# Patient Record
Sex: MALE | Race: WHITE | Employment: OTHER | URBAN - METROPOLITAN AREA
[De-identification: names, ages, dates, MRNs, and addresses within clinical notes are randomized per-mention and may not be internally consistent; named-entity substitution may affect disease eponyms.]

---

## 2019-08-11 DIAGNOSIS — M25.561 ACUTE PAIN OF RIGHT KNEE: Primary | ICD-10-CM

## 2019-08-12 ENCOUNTER — OFFICE VISIT (OUTPATIENT)
Dept: ORTHOPEDIC SURGERY | Age: 74
End: 2019-08-12
Payer: MEDICARE

## 2019-08-12 VITALS — HEIGHT: 70 IN | WEIGHT: 220 LBS | BODY MASS INDEX: 31.5 KG/M2 | TEMPERATURE: 98 F

## 2019-08-12 DIAGNOSIS — M17.11 PRIMARY OSTEOARTHRITIS OF RIGHT KNEE: Primary | ICD-10-CM

## 2019-08-12 PROCEDURE — G8417 CALC BMI ABV UP PARAM F/U: HCPCS | Performed by: ORTHOPAEDIC SURGERY

## 2019-08-12 PROCEDURE — 4004F PT TOBACCO SCREEN RCVD TLK: CPT | Performed by: ORTHOPAEDIC SURGERY

## 2019-08-12 PROCEDURE — 99203 OFFICE O/P NEW LOW 30 MIN: CPT | Performed by: ORTHOPAEDIC SURGERY

## 2019-08-12 PROCEDURE — G8427 DOCREV CUR MEDS BY ELIG CLIN: HCPCS | Performed by: ORTHOPAEDIC SURGERY

## 2019-08-12 PROCEDURE — 3017F COLORECTAL CA SCREEN DOC REV: CPT | Performed by: ORTHOPAEDIC SURGERY

## 2019-08-12 PROCEDURE — 4040F PNEUMOC VAC/ADMIN/RCVD: CPT | Performed by: ORTHOPAEDIC SURGERY

## 2019-08-12 PROCEDURE — 1123F ACP DISCUSS/DSCN MKR DOCD: CPT | Performed by: ORTHOPAEDIC SURGERY

## 2019-08-12 RX ORDER — PRAVASTATIN SODIUM 20 MG
TABLET ORAL
Refills: 1 | COMMUNITY
Start: 2019-07-11

## 2019-08-12 NOTE — PROGRESS NOTES
Chief Complaint   Patient presents with    Knee Pain     Right Knee, x couple months, states of pain in front of the knee. Subjective:     Patient ID: Filomena Han is a 68 y.o..  male    Knee Pain  Patient complains of right knee pain. This is evaluated as a personal injury. There was not a history of injury. The pain began 3 months ago. The pain is located medial, lateral. He describes  His symptoms as aching. He has not experienced popping, clicking, locking, and giving way in the affected knee. The patient has had pain with kneeling, squating, and climbing stairs. Symptoms improve with rest. The symptoms are worse with activity. The knee has not given out or felt unstable. The patient can bend and straighten the knee fully. The patient is active in none. Treatment to date has been Tylenol, NSAID's, with significant relief. History reviewed. No pertinent past medical history. History reviewed. No pertinent surgical history.     Current Outpatient Medications:     pravastatin (PRAVACHOL) 20 MG tablet, TK 1 T PO QD HS, Disp: , Rfl: 1    aspirin 81 MG tablet, Take 81 mg by mouth daily, Disp: , Rfl:   No Known Allergies  Social History     Socioeconomic History    Marital status: Unknown     Spouse name: Not on file    Number of children: Not on file    Years of education: Not on file    Highest education level: Not on file   Occupational History    Not on file   Social Needs    Financial resource strain: Not on file    Food insecurity:     Worry: Not on file     Inability: Not on file    Transportation needs:     Medical: Not on file     Non-medical: Not on file   Tobacco Use    Smoking status: Not on file   Substance and Sexual Activity    Alcohol use: Not on file    Drug use: Not on file    Sexual activity: Not on file   Lifestyle    Physical activity:     Days per week: Not on file     Minutes per session: Not on file    Stress: Not on file   Relationships    Social fremitus. Skin:  Upon inspection: the skin appears warm, dry and intact. There is  a previous scar over the affected area. There is any cellulitis, lymphedema or cutaneous lesions noted in the lower extremities. Upon palpation there is no induration noted. Neurologic:  Gait: normal;  Motor exam of the lower extremities show ; quadriceps, hamstrings, foot dorsi and plantar flexors intact R.  5/5 and L. 5/5. Deep tendon reflexes are 2/4 at the knees and 2/4 at the ankles with strong extensor hallicus longus motor strength bilaterally. Sensory to both feet is intact to all sensory roots. Cardiovascular: The vascular exam is normal and is well perfused to distal extremities. Distal pulses DP/PT: R. 2+; L. 2+. There is cap refill noted less than two seconds in all digits. There is not edema of the bilateral lower extremities. There is not varicosities noted in the distal extremities. Lymph:  Upon palpation,  there is no lymphadenopathy noted in bilateral lower extremities. Musculoskeletal:  Gait: normal; examination of the nails and digits reveal no cyanosis or clubbing. Lumbar exam:  On visual inspection, there is not deformity of the spine. full range of motion, no tenderness, palpable spasm or pain on motion. Special tests: Straight Leg Raise negative, Skylar test negative. Hip exam:   Upon inspection, there is not deformity noted. Upon palpation there is not tenderness. ROM: is   Left 10/20, right 5/10 and symmetrical.   Strength: Hip Flexors 5/5; Hip Abductors 5/5; Hip Adduction 5/5. Knee exam:  Right knee exam shows;  range of motion of R. Knee is 0 to 120, and L. Knee is 0 to 120. The patient does not have  pain on motion, effusion is none, there is tenderness over the  lateral, anterior region, there are not any masses, there is not ligamentous instability, there is not  deformity noted.     Knee exam: neither positive for moderate crepitations, some mild tenderness laxity

## 2021-10-14 DIAGNOSIS — M25.561 RIGHT KNEE PAIN, UNSPECIFIED CHRONICITY: Primary | ICD-10-CM

## 2021-10-18 ENCOUNTER — OFFICE VISIT (OUTPATIENT)
Dept: ORTHOPEDIC SURGERY | Age: 76
End: 2021-10-18
Payer: MEDICARE

## 2021-10-18 VITALS — HEIGHT: 70 IN | TEMPERATURE: 98 F | BODY MASS INDEX: 30.64 KG/M2 | WEIGHT: 214 LBS

## 2021-10-18 DIAGNOSIS — M17.11 PRIMARY OSTEOARTHRITIS OF RIGHT KNEE: Primary | ICD-10-CM

## 2021-10-18 DIAGNOSIS — M70.61 GREATER TROCHANTERIC BURSITIS OF RIGHT HIP: ICD-10-CM

## 2021-10-18 PROCEDURE — 1123F ACP DISCUSS/DSCN MKR DOCD: CPT | Performed by: ORTHOPAEDIC SURGERY

## 2021-10-18 PROCEDURE — 4004F PT TOBACCO SCREEN RCVD TLK: CPT | Performed by: ORTHOPAEDIC SURGERY

## 2021-10-18 PROCEDURE — 20610 DRAIN/INJ JOINT/BURSA W/O US: CPT | Performed by: ORTHOPAEDIC SURGERY

## 2021-10-18 PROCEDURE — 4040F PNEUMOC VAC/ADMIN/RCVD: CPT | Performed by: ORTHOPAEDIC SURGERY

## 2021-10-18 PROCEDURE — G8427 DOCREV CUR MEDS BY ELIG CLIN: HCPCS | Performed by: ORTHOPAEDIC SURGERY

## 2021-10-18 PROCEDURE — 99214 OFFICE O/P EST MOD 30 MIN: CPT | Performed by: ORTHOPAEDIC SURGERY

## 2021-10-18 PROCEDURE — G8484 FLU IMMUNIZE NO ADMIN: HCPCS | Performed by: ORTHOPAEDIC SURGERY

## 2021-10-18 PROCEDURE — G8417 CALC BMI ABV UP PARAM F/U: HCPCS | Performed by: ORTHOPAEDIC SURGERY

## 2021-10-18 RX ORDER — TRIAMCINOLONE ACETONIDE 40 MG/ML
40 INJECTION, SUSPENSION INTRA-ARTICULAR; INTRAMUSCULAR ONCE
Status: COMPLETED | OUTPATIENT
Start: 2021-10-18 | End: 2021-10-18

## 2021-10-18 RX ADMIN — TRIAMCINOLONE ACETONIDE 40 MG: 40 INJECTION, SUSPENSION INTRA-ARTICULAR; INTRAMUSCULAR at 17:03

## 2021-10-18 NOTE — PROGRESS NOTES
Chief Complaint   Patient presents with    Knee Pain     Right knee F/U. Hx of DJD. intermittent episodes of weakness. patient now complaining of lateral right hip pain. Subjective:     Patient ID: José Chowdhury is a 68 y.o..  male    Knee Pain  Patient complains of right knee pain. This is evaluated as a personal injury. There was not a history of injury. The pain began 3 months ago. The pain is located medial, lateral. He describes  His symptoms as aching. He has not experienced popping, clicking, locking, and giving way in the affected knee. The patient has had pain with kneeling, squating, and climbing stairs. Symptoms improve with rest. The symptoms are worse with activity. The knee has not given out or felt unstable. The patient can bend and straighten the knee fully. The patient is active in none. Treatment to date has been Tylenol, NSAID's, with significant relief. He is c/o righ thip pain lateral, no injury no treatment  No past medical history on file. No past surgical history on file.     Current Outpatient Medications:     pravastatin (PRAVACHOL) 20 MG tablet, TK 1 T PO QD HS, Disp: , Rfl: 1    aspirin 81 MG tablet, Take 81 mg by mouth daily, Disp: , Rfl:   No Known Allergies  Social History     Socioeconomic History    Marital status: Unknown     Spouse name: Not on file    Number of children: Not on file    Years of education: Not on file    Highest education level: Not on file   Occupational History    Not on file   Tobacco Use    Smoking status: Not on file   Substance and Sexual Activity    Alcohol use: Not on file    Drug use: Not on file    Sexual activity: Not on file   Other Topics Concern    Not on file   Social History Narrative    Not on file     Social Determinants of Health     Financial Resource Strain:     Difficulty of Paying Living Expenses:    Food Insecurity:     Worried About Running Out of Food in the Last Year:     [address] in the Last Year:    Transportation Needs:     Lack of Transportation (Medical):  Lack of Transportation (Non-Medical):    Physical Activity:     Days of Exercise per Week:     Minutes of Exercise per Session:    Stress:     Feeling of Stress :    Social Connections:     Frequency of Communication with Friends and Family:     Frequency of Social Gatherings with Friends and Family:     Attends Mu-ism Services:     Active Member of Clubs or Organizations:     Attends Club or Organization Meetings:     Marital Status:    Intimate Partner Violence:     Fear of Current or Ex-Partner:     Emotionally Abused:     Physically Abused:     Sexually Abused:      No family history on file. REVIEW OF SYSTEMS:     General/Constitution:  (-)weight loss, (-)fever, (-)chills, (-)weakness. Skin: (-) rash,(-) psoriasis,(-) eczema, (-)skin cancer. Musculoskeletal: (-) fractures,  (-) dislocations,(-) collagen vascular disease, (-) fibromyalgia, (-) multiple sclerosis, (-) muscular dystrophy, (-) RSD,(-) joint pain (-)swelling, (-) joint pain,swelling. Neurologic: (-) epilepsy, (-)seizures,(-) brain tumor,(-) TIA, (-)stroke, (-)headaches, (-)Parkinson disease,(-) memory loss, (-) LOC. Cardiovascular: (-) Chest pain, (-) swelling in legs/feet, (-) SOB, (-) cramping in legs/feet with walking. Respiratory: (-) SOB, (-) Coughing, (-) night sweats. GI: (-) nausea, (-) vomiting, (-) diarrhea, (-) blood in stool, (-) gastric ulcer. Psychiatric: (-) Depression, (-) Anxiety, (-) bipolar disease, (-) Alzheimer's Disease  Allergic/Immunologic: (-) allergies latex, (-) allergies metal, (-) skin sensitivity. Hematlogic: (-) anemia, (-) blood transfusion, (-) DVT/PE, (-) Clotting disorders    Subjective:    Constitution:  Temp 98 °F (36.7 °C)   Ht 5' 10\" (1.778 m)   Wt 214 lb (97.1 kg)   BMI 30.71 kg/m²     Psycihatric:  The patient is alert and oriented x 3, appears to be stated age and in no distress. Respiratory:  Respiratory effort is not labored. Patient is not gasping. Palpation of the chest reveals no tactile fremitus. Skin:  Upon inspection: the skin appears warm, dry and intact. There is  a previous scar over the affected area. There is any cellulitis, lymphedema or cutaneous lesions noted in the lower extremities. Upon palpation there is no induration noted. Neurologic:  Gait: normal;  Motor exam of the lower extremities show ; quadriceps, hamstrings, foot dorsi and plantar flexors intact R.  5/5 and L. 5/5. Deep tendon reflexes are 2/4 at the knees and 2/4 at the ankles with strong extensor hallicus longus motor strength bilaterally. Sensory to both feet is intact to all sensory roots. Cardiovascular: The vascular exam is normal and is well perfused to distal extremities. Distal pulses DP/PT: R. 2+; L. 2+. There is cap refill noted less than two seconds in all digits. There is not edema of the bilateral lower extremities. There is not varicosities noted in the distal extremities. Lymph:  Upon palpation,  there is no lymphadenopathy noted in bilateral lower extremities. Musculoskeletal:  Gait: normal; examination of the nails and digits reveal no cyanosis or clubbing. Lumbar exam:  On visual inspection, there is not deformity of the spine. full range of motion, no tenderness, palpable spasm or pain on motion. Special tests: Straight Leg Raise negative, Skylar test negative. Hip exam:   Upon inspection, there is not deformity noted. Upon palpation there is tenderness to trochanter. ROM: is   Left 10/20, right 5/10 and symmetrical.   Strength: Hip Flexors 5/5; Hip Abductors 5/5; Hip Adduction 5/5. Knee exam:  Right knee exam shows;  range of motion of R. Knee is 0 to 120, and L. Knee is 0 to 120.  The patient does not have  pain on motion, effusion is none, there is tenderness over the  lateral, anterior region, there are not any masses, there is not ligamentous instability, there is not  deformity noted. Knee exam: neither positive for moderate crepitations, some mild tenderness laxity is not noted with  stress. There is not a popliteal cyst.    R. Knee:  Lachman's negative, Anterior Drawer negative, Posterior Drawer negative  Shira's negative, Thallasy  negative,   PF grind test negative, Apprehension test negative, Patellar J sign  negative  L. Knee:  Lachman's negative, Anterior Drawer negative, Posterior Drawer negative  Shira's negative, Thallasy  negative,   PF grind test negative, Apprehension test negative,  Patellar J sign  negative    Xray Exam:  3 views right knee. Intact alignment. No acute osseous   abnormality or joint effusion. Mild narrowing of the lateral   compartment with small marginal spurs. Similar more severe changes on   the left. Radiographic findings reviewed with patient    Assessment:  Encounter Diagnoses   Name Primary?  Primary osteoarthritis of right knee Yes    Greater trochanteric bursitis of right hip        Plan:  Natural history and expected course discussed. Questions answered. Educational materials distributed. Rest, ice, compression, and elevation (RICE) therapy. Reduction in offending activity. I had a lengthy discussion with the patient regarding their diagnosis. I explained treatment options including surgical vs non surgical treatment. I reviewed in detail the risks and benefits and outlined the procedure in detail with expected outcomes and possible complications. I also discussed non surgical treatment such as injections (CSI and visco supplementation), physical therapy, topical creams and NSAID's. They have elected for conservative management at this time. I will proceed with a cortisone injection in the Right knee. Verbal and written consent was obtained for the injections. The skin was prepped with alcohol. 1mL of Kenalog 40mg and 9mL of 0.25% Marcaine was  injected to Right knee.  The injection was given through the lateral side of the knee. The patient tolerated the injection well. I will see the patient back in 1 monthfor bernardo day with xr  Verbal and written consent was obtained by the patient. The patient was positioned on His unaffected side and prepped with alcohol. He was injected with 9ml of 0.25% Marcaine and 1ml of Kenalog 40 mg in the Right greater trochanter of the hip. He tolerated the injection well.

## 2021-11-12 DIAGNOSIS — M70.61 GREATER TROCHANTERIC BURSITIS OF RIGHT HIP: Primary | ICD-10-CM

## 2021-11-15 ENCOUNTER — OFFICE VISIT (OUTPATIENT)
Dept: ORTHOPEDIC SURGERY | Age: 76
End: 2021-11-15
Payer: MEDICARE

## 2021-11-15 VITALS — TEMPERATURE: 98 F | WEIGHT: 214 LBS | BODY MASS INDEX: 30.64 KG/M2 | HEIGHT: 70 IN

## 2021-11-15 DIAGNOSIS — M17.11 PRIMARY OSTEOARTHRITIS OF RIGHT KNEE: ICD-10-CM

## 2021-11-15 DIAGNOSIS — M16.11 PRIMARY OSTEOARTHRITIS OF RIGHT HIP: ICD-10-CM

## 2021-11-15 DIAGNOSIS — M70.61 GREATER TROCHANTERIC BURSITIS OF RIGHT HIP: Primary | ICD-10-CM

## 2021-11-15 PROCEDURE — 4004F PT TOBACCO SCREEN RCVD TLK: CPT | Performed by: ORTHOPAEDIC SURGERY

## 2021-11-15 PROCEDURE — 1123F ACP DISCUSS/DSCN MKR DOCD: CPT | Performed by: ORTHOPAEDIC SURGERY

## 2021-11-15 PROCEDURE — G8417 CALC BMI ABV UP PARAM F/U: HCPCS | Performed by: ORTHOPAEDIC SURGERY

## 2021-11-15 PROCEDURE — G8427 DOCREV CUR MEDS BY ELIG CLIN: HCPCS | Performed by: ORTHOPAEDIC SURGERY

## 2021-11-15 PROCEDURE — 4040F PNEUMOC VAC/ADMIN/RCVD: CPT | Performed by: ORTHOPAEDIC SURGERY

## 2021-11-15 PROCEDURE — 99213 OFFICE O/P EST LOW 20 MIN: CPT | Performed by: ORTHOPAEDIC SURGERY

## 2021-11-15 PROCEDURE — G8484 FLU IMMUNIZE NO ADMIN: HCPCS | Performed by: ORTHOPAEDIC SURGERY

## 2022-01-10 ENCOUNTER — OFFICE VISIT (OUTPATIENT)
Dept: ORTHOPEDIC SURGERY | Age: 77
End: 2022-01-10
Payer: MEDICARE

## 2022-01-10 VITALS — WEIGHT: 214 LBS | HEIGHT: 70 IN | BODY MASS INDEX: 30.64 KG/M2

## 2022-01-10 DIAGNOSIS — M16.11 PRIMARY OSTEOARTHRITIS OF RIGHT HIP: Primary | ICD-10-CM

## 2022-01-10 PROCEDURE — G8484 FLU IMMUNIZE NO ADMIN: HCPCS | Performed by: ORTHOPAEDIC SURGERY

## 2022-01-10 PROCEDURE — 1123F ACP DISCUSS/DSCN MKR DOCD: CPT | Performed by: ORTHOPAEDIC SURGERY

## 2022-01-10 PROCEDURE — 4004F PT TOBACCO SCREEN RCVD TLK: CPT | Performed by: ORTHOPAEDIC SURGERY

## 2022-01-10 PROCEDURE — G8427 DOCREV CUR MEDS BY ELIG CLIN: HCPCS | Performed by: ORTHOPAEDIC SURGERY

## 2022-01-10 PROCEDURE — 99214 OFFICE O/P EST MOD 30 MIN: CPT | Performed by: ORTHOPAEDIC SURGERY

## 2022-01-10 PROCEDURE — 4040F PNEUMOC VAC/ADMIN/RCVD: CPT | Performed by: ORTHOPAEDIC SURGERY

## 2022-01-10 PROCEDURE — G8417 CALC BMI ABV UP PARAM F/U: HCPCS | Performed by: ORTHOPAEDIC SURGERY

## 2022-01-10 NOTE — PROGRESS NOTES
Chief Complaint   Patient presents with    Hip Pain     Right hip follow up. Subjective:     Patient ID: Johnathon Caller is a 68 y.o..  male    Knee Pain  Patient complains of right knee and right hip pain. This is evaluated as a personal injury. There was not a history of injury. The pain began 3 + months ago. The pain is located medial, lateral. He describes  His symptoms as aching. He has not experienced popping, clicking, locking, and giving way in the affected knee. The patient has had pain with kneeling, squating, and climbing stairs. Symptoms improve with rest. The symptoms are worse with activity. The knee has not given out or felt unstable. The patient can bend and straighten the knee fully. The patient is active in none. Treatment to date has been Tylenol, NSAID's and CSI to greater trochanter 'without significant relief. He is c/o righ thip pain lateral, no injury no treatment  No past medical history on file. No past surgical history on file.     Current Outpatient Medications:     pravastatin (PRAVACHOL) 20 MG tablet, TK 1 T PO QD HS, Disp: , Rfl: 1    aspirin 81 MG tablet, Take 81 mg by mouth daily, Disp: , Rfl:   No Known Allergies  Social History     Socioeconomic History    Marital status: Unknown     Spouse name: Not on file    Number of children: Not on file    Years of education: Not on file    Highest education level: Not on file   Occupational History    Not on file   Tobacco Use    Smoking status: Not on file    Smokeless tobacco: Not on file   Substance and Sexual Activity    Alcohol use: Not on file    Drug use: Not on file    Sexual activity: Not on file   Other Topics Concern    Not on file   Social History Narrative    Not on file     Social Determinants of Health     Financial Resource Strain:     Difficulty of Paying Living Expenses: Not on file   Food Insecurity:     Worried About Running Out of Food in the Last Year: Not on file    [employer] kelley Food in the Last Year: Not on file   Transportation Needs:     Lack of Transportation (Medical): Not on file    Lack of Transportation (Non-Medical): Not on file   Physical Activity:     Days of Exercise per Week: Not on file    Minutes of Exercise per Session: Not on file   Stress:     Feeling of Stress : Not on file   Social Connections:     Frequency of Communication with Friends and Family: Not on file    Frequency of Social Gatherings with Friends and Family: Not on file    Attends Yarsani Services: Not on file    Active Member of [address] [place] [address] [employer] or Organizations: Not on file    Attends Club or Organization Meetings: Not on file    Marital Status: Not on file   Intimate Partner Violence:     Fear of Current or Ex-Partner: Not on file    Emotionally Abused: Not on file    Physically Abused: Not on file    Sexually Abused: Not on file   Housing Stability:     Unable to Pay for Housing in the Last Year: Not on file    Number of [place] in the Last Year: Not on file    Unstable Housing in the Last Year: Not on file     No family history on file. REVIEW OF SYSTEMS:     General/Constitution:  (-)weight loss, (-)fever, (-)chills, (-)weakness. Skin: (-) rash,(-) psoriasis,(-) eczema, (-)skin cancer. Musculoskeletal: (-) fractures,  (-) dislocations,(-) collagen vascular disease, (-) fibromyalgia, (-) multiple sclerosis, (-) muscular dystrophy, (-) RSD,(-) joint pain (-)swelling, (-) joint pain,swelling. Neurologic: (-) epilepsy, (-)seizures,(-) brain tumor,(-) TIA, (-)stroke, (-)headaches, (-)Parkinson disease,(-) memory loss, (-) LOC. Cardiovascular: (-) Chest pain, (-) swelling in legs/feet, (-) SOB, (-) cramping in legs/feet with walking. Respiratory: (-) SOB, (-) Coughing, (-) night sweats. GI: (-) nausea, (-) vomiting, (-) diarrhea, (-) blood in stool, (-) gastric ulcer.   Psychiatric: (-) Depression, (-) Anxiety, (-) bipolar disease, (-) Alzheimer's Disease  Allergic/Immunologic: (-) allergies latex, (-) allergies metal, (-) skin sensitivity. Hematlogic: (-) anemia, (-) blood transfusion, (-) DVT/PE, (-) Clotting disorders    Subjective:  _Ht 5' 10\" (1.778 m)   Wt 214 lb (97.1 kg)   BMI 30.71 kg/m²  Vital signs are stable. In general, patient is awake, alert and oriented X3, in no apparent distress. Examination of HENT reveals normocephalic, atraumatic. PERRLA/EOMI sclera are white. Conjunctivae are clear. TM's are intact. Pharynx is pink and moist.  Uvula and tongue are midline. Heart: Positive S1 and positive S2 with regular rate and rhythm. Lungs: Clear to auscultation bilaterally without rales, rhonchi or wheezes. Abdomen: soft, nontender. Positive bowel sounds. No organomegaly. No guarding or rigidity. Constitution:  Ht 5' 10\" (1.778 m)   Wt 214 lb (97.1 kg)   BMI 30.71 kg/m²     Psycihatric:  The patient is alert and oriented x 3, appears to be stated age and in no distress. Respiratory:  Respiratory effort is not labored. Patient is not gasping. Palpation of the chest reveals no tactile fremitus. Skin:  Upon inspection: the skin appears warm, dry and intact. There is  a previous scar over the affected area. There is any cellulitis, lymphedema or cutaneous lesions noted in the lower extremities. Upon palpation there is no induration noted. Neurologic:  Gait: normal;  Motor exam of the lower extremities show ; quadriceps, hamstrings, foot dorsi and plantar flexors intact R.  5/5 and L. 5/5. Deep tendon reflexes are 2/4 at the knees and 2/4 at the ankles with strong extensor hallicus longus motor strength bilaterally. Sensory to both feet is intact to all sensory roots. Cardiovascular: The vascular exam is normal and is well perfused to distal extremities. Distal pulses DP/PT: R. 2+; L. 2+. There is cap refill noted less than two seconds in all digits. There is not edema of the bilateral lower extremities.   There is not varicosities noted in the distal extremities. Lymph:  Upon palpation,  there is no lymphadenopathy noted in bilateral lower extremities. Musculoskeletal:  Gait: normal; examination of the nails and digits reveal no cyanosis or clubbing. Lumbar exam:  On visual inspection, there is not deformity of the spine. full range of motion, no tenderness, palpable spasm or pain on motion. Special tests: Straight Leg Raise negative, Skylar test negative. Hip exam:   Upon inspection, there is not deformity noted. Upon palpation there is tenderness to trochanter and groin. ROM: is   Left 10/20, right 5/10. Strength: Hip Flexors 5/5; Hip Abductors 5/5; Hip Adduction 5/5. Knee exam:  Right knee exam shows;  range of motion of R. Knee is 0 to 120, and L. Knee is 0 to 120. The patient does not have  pain on motion, effusion is none, there is tenderness over the  lateral, anterior region, there are not any masses, there is not ligamentous instability, there is not  deformity noted. Knee exam: neither positive for moderate crepitations, some mild tenderness laxity is not noted with  stress. There is not a popliteal cyst.    R. Knee:  Lachman's negative, Anterior Drawer negative, Posterior Drawer negative  Shira's negative, Thallasy  negative,   PF grind test negative, Apprehension test negative, Patellar J sign  negative  L. Knee:  Lachman's negative, Anterior Drawer negative, Posterior Drawer negative  Shira's negative, Thallasy  negative,   PF grind test negative, Apprehension test negative,  Patellar J sign  negative    Xray Exam:  3 views right knee. Intact alignment. No acute osseous   abnormality or joint effusion. Mild narrowing of the lateral   compartment with small marginal spurs. Similar more severe changes on   the left. Radiographic findings reviewed with patient    Assessment:  Encounter Diagnoses   Name Primary?     Primary osteoarthritis of right hip Yes       Plan:  Natural history and expected course discussed. Questions answered. Educational materials distributed. Rest, ice, compression, and elevation (RICE) therapy. Reduction in offending activity. I had a lengthy discussion with the patient regarding their diagnosis. I explained treatment options including surgical vs non surgical treatment. I reviewed in detail the risks and benefits and outlined the procedure in detail with expected outcomes and possible complications. I also discussed non surgical treatment such as injections (CSI and visco supplementation), physical therapy, topical creams and NSAID's. They have elected for surgical management at this time. We discussed various treatment options with the patient including physical therapy and cortisone injections. The patient is unable to ambulate more than 100 feet and is unable to perform the average daily activities including:  Light housework, activities of daily living, donning clothes, toileting and exercise. Patient has failed previous conservative measures including cortisone injections, NSAIDs, PT, HEP and pain medication and is currently a fall risk due to disability and decreased functioning. The patient wishes to have the total hip arthroplasty. The risks and benefits of a total hip replacement were discussed with the patient. The risks include but are not limited to: infection, injury to blood vessels and nerves, non relief of symptoms, arthrofibrosis of hip, aseptic loosening of prosthesis, intraoperative fracture, blood loss, PE/DVT, MI, dislocation of hip, need for further operative intervention and death. The patient is aware of the risks and wished to proceed with a Right total hip replacement 2/2/2022. We will obtain medical clearence from the PCP. At least 30 minutes was spent discussing the diagnosis and treatment options with the patient with at least 50% of the time was spent with decision making and counseling the patient.   The patient was counseled at length about the risks of dianne Covid-19 during their perioperative period and any recovery window from their procedure. The patient was made aware that dianne Covid-19  may worsen their prognosis for recovering from their procedure  and lend to a higher morbidity and/or mortality risk. All material risks, benefits, and reasonable alternatives including postponing the procedure were discussed. The patient does wish to proceed with the procedure at this time.

## 2022-01-24 ENCOUNTER — TELEPHONE (OUTPATIENT)
Dept: ORTHOPEDIC SURGERY | Age: 77
End: 2022-01-24

## 2022-01-24 ASSESSMENT — HOOS JR
GOING UP OR DOWN STAIRS: 1
HOOS JR RAW SCORE: 4
LYING IN BED (TURNING OVER, MAINTAINING HIP POSITION): 2
BENDING TO THE FLOOR TO PICK UP OBJECT: 0
SITTING: 0
WALKING ON UNEVEN SURFACE: 1
RISING FROM SITTING: 0

## 2022-01-24 ASSESSMENT — PROMIS GLOBAL HEALTH SCALE
IN THE PAST 7 DAYS, HOW WOULD YOU RATE YOUR PAIN ON AVERAGE [ON A SCALE FROM 0 (NO PAIN) TO 10 (WORST IMAGINABLE PAIN)]?: 1
IN THE PAST 7 DAYS, HOW OFTEN HAVE YOU BEEN BOTHERED BY EMOTIONAL PROBLEMS, SUCH AS FEELING ANXIOUS, DEPRESSED, OR IRRITABLE [ON A SCALE FROM 1 (NEVER) TO 5 (ALWAYS)]?: 1
WHO IS THE PERSON COMPLETING THE PROMIS V1.1 SURVEY?: 0
TO WHAT EXTENT ARE YOU ABLE TO CARRY OUT YOUR EVERYDAY PHYSICAL ACTIVITIES SUCH AS WALKING, CLIMBING STAIRS, CARRYING GROCERIES, OR MOVING A CHAIR [ON A SCALE OF 1 (NOT AT ALL) TO 5 (COMPLETELY)]?: 5
IN GENERAL, HOW WOULD YOU RATE YOUR MENTAL HEALTH, INCLUDING YOUR MOOD AND YOUR ABILITY TO THINK [ON A SCALE OF 1 (POOR) TO 5 (EXCELLENT)]?: 4
IN GENERAL, WOULD YOU SAY YOUR QUALITY OF LIFE IS...[ON A SCALE OF 1 (POOR) TO 5 (EXCELLENT)]: 4
HOW IS THE PROMIS V1.1 BEING ADMINISTERED?: 2
IN GENERAL, PLEASE RATE HOW WELL YOU CARRY OUT YOUR USUAL SOCIAL ACTIVITIES (INCLUDES ACTIVITIES AT HOME, AT WORK, AND IN YOUR COMMUNITY, AND RESPONSIBILITIES AS A PARENT, CHILD, SPOUSE, EMPLOYEE, FRIEND, ETC) [ON A SCALE OF 1 (POOR) TO 5 (EXCELLENT)]?: 5
SUM OF RESPONSES TO QUESTIONS 3, 6, 7, & 8: 15
IN GENERAL, HOW WOULD YOU RATE YOUR PHYSICAL HEALTH [ON A SCALE OF 1 (POOR) TO 5 (EXCELLENT)]?: 4
IN GENERAL, HOW WOULD YOU RATE YOUR SATISFACTION WITH YOUR SOCIAL ACTIVITIES AND RELATIONSHIPS [ON A SCALE OF 1 (POOR) TO 5 (EXCELLENT)]?: 4
SUM OF RESPONSES TO QUESTIONS 2, 4, 5, & 10: 13
IN GENERAL, WOULD YOU SAY YOUR HEALTH IS...[ON A SCALE OF 1 (POOR) TO 5 (EXCELLENT)]: 5
IN THE PAST 7 DAYS, HOW WOULD YOU RATE YOUR FATIGUE ON AVERAGE [ON A SCALE FROM 1 (NONE) TO 5 (VERY SEVERE)]?: 5

## 2022-01-24 NOTE — TELEPHONE ENCOUNTER
Prior Authorization Form:      DEMOGRAPHICS:                     Patient Name:  Duong Thorpe  Patient :  1945            Insurance:  Payor: MEDICARE / Plan: MEDICARE PART A AND B / Product Type: *No Product type* /   Insurance ID Number:    Payor/Plan Subscr  Sex Relation Sub. Ins. ID Effective Group Num   1. [place]ARE - [place]* Sharmaine Velasquez 1945 Male Self [id number] 1/1/15                                    [address] [address]   2. Wilber Lemons 89 R 1945 Male Self [id number] plan f                                    [id number]         DIAGNOSIS & PROCEDURE:                       Procedure/Operation: Right Total Hip arthroplasty           CPT Code: 83523    Diagnosis:  Right hip DJD    ICD10 Code: M16.11    Location[facility]  [facility]    Surgeon:   Luke Sue Ra    SCHEDULING INFORMATION:                          Date: 22    Time: 7am              Anesthesia:  Spinal                                                       Status:  Observation        Special Comments:  Ofelia Frederick       Electronically signed by Mike Quick ATC on 2022 at 9:29 AM

## 2022-01-28 ENCOUNTER — HOSPITAL ENCOUNTER (OUTPATIENT)
Dept: PREADMISSION TESTING | Age: 77
Discharge: HOME | End: 2022-01-28
Payer: MEDICARE

## 2022-01-28 ENCOUNTER — HOSPITAL ENCOUNTER (OUTPATIENT)
Dept: GENERAL RADIOLOGY | Age: 77
Discharge: HOME | End: 2022-01-30
Payer: MEDICARE

## 2022-01-28 VITALS
HEART RATE: 65 BPM | BODY MASS INDEX: 31.82 KG/M2 | SYSTOLIC BLOOD PRESSURE: 132 MMHG | OXYGEN SATURATION: 97 % | TEMPERATURE: 97.2 F | HEIGHT: 70 IN | RESPIRATION RATE: 18 BRPM | DIASTOLIC BLOOD PRESSURE: 60 MMHG | WEIGHT: 222.25 LBS

## 2022-01-28 DIAGNOSIS — M16.11 PRIMARY OSTEOARTHRITIS OF RIGHT HIP: ICD-10-CM

## 2022-01-28 LAB
ALBUMIN SERPL-MCNC: 4.3 G/DL (ref 3.5–5.2)
ALP BLD-CCNC: 75 U/L (ref 40–129)
ALT SERPL-CCNC: 21 U/L (ref 0–40)
ANION GAP SERPL CALCULATED.3IONS-SCNC: 11 MMOL/L (ref 7–16)
APTT: 30.5 SEC (ref 24.5–35.1)
AST SERPL-CCNC: 24 U/L (ref 0–39)
BASOPHILS ABSOLUTE: 0.05 E9/L (ref 0–0.2)
BASOPHILS RELATIVE PERCENT: 0.8 % (ref 0–2)
BILIRUB SERPL-MCNC: 0.3 MG/DL (ref 0–1.2)
BILIRUBIN URINE: NEGATIVE
BLOOD, URINE: NEGATIVE
BUN BLDV-MCNC: 16 MG/DL (ref 6–23)
CALCIUM SERPL-MCNC: 9 MG/DL (ref 8.6–10.2)
CHLORIDE BLD-SCNC: 106 MMOL/L (ref 98–107)
CLARITY: CLEAR
CO2: 25 MMOL/L (ref 22–29)
COLOR: YELLOW
CREAT SERPL-MCNC: 0.8 MG/DL (ref 0.7–1.2)
EOSINOPHILS ABSOLUTE: 0.3 E9/L (ref 0.05–0.5)
EOSINOPHILS RELATIVE PERCENT: 5 % (ref 0–6)
GFR AFRICAN AMERICAN: >60
GFR NON-AFRICAN AMERICAN: >60 ML/MIN/1.73
GLUCOSE BLD-MCNC: 78 MG/DL (ref 74–99)
GLUCOSE URINE: NEGATIVE MG/DL
HBA1C MFR BLD: 5.1 % (ref 4–5.6)
HCT VFR BLD CALC: 45 % (ref 37–54)
HEMOGLOBIN: 14.5 G/DL (ref 12.5–16.5)
IMMATURE GRANULOCYTES #: 0.04 E9/L
IMMATURE GRANULOCYTES %: 0.7 % (ref 0–5)
INR BLD: 1
KETONES, URINE: NEGATIVE MG/DL
LEUKOCYTE ESTERASE, URINE: NEGATIVE
LYMPHOCYTES ABSOLUTE: 1.43 E9/L (ref 1.5–4)
LYMPHOCYTES RELATIVE PERCENT: 23.9 % (ref 20–42)
MCH RBC QN AUTO: 31.2 PG (ref 26–35)
MCHC RBC AUTO-ENTMCNC: 32.2 % (ref 32–34.5)
MCV RBC AUTO: 96.8 FL (ref 80–99.9)
MONOCYTES ABSOLUTE: 0.63 E9/L (ref 0.1–0.95)
MONOCYTES RELATIVE PERCENT: 10.5 % (ref 2–12)
NEUTROPHILS ABSOLUTE: 3.54 E9/L (ref 1.8–7.3)
NEUTROPHILS RELATIVE PERCENT: 59.1 % (ref 43–80)
NITRITE, URINE: NEGATIVE
PDW BLD-RTO: 12.8 FL (ref 11.5–15)
PH UA: 5.5 (ref 5–9)
PLATELET # BLD: 292 E9/L (ref 130–450)
PMV BLD AUTO: 10 FL (ref 7–12)
POTASSIUM SERPL-SCNC: 4.3 MMOL/L (ref 3.5–5)
PREALBUMIN: 26 MG/DL (ref 20–40)
PROTEIN UA: NEGATIVE MG/DL
PROTHROMBIN TIME: 11.9 SEC (ref 9.3–12.4)
RBC # BLD: 4.65 E12/L (ref 3.8–5.8)
SODIUM BLD-SCNC: 142 MMOL/L (ref 132–146)
SPECIFIC GRAVITY UA: >=1.03 (ref 1–1.03)
TOTAL PROTEIN: 6.9 G/DL (ref 6.4–8.3)
UROBILINOGEN, URINE: 0.2 E.U./DL
WBC # BLD: 6 E9/L (ref 4.5–11.5)

## 2022-01-28 PROCEDURE — 71046 X-RAY EXAM CHEST 2 VIEWS: CPT

## 2022-01-28 PROCEDURE — 36415 COLL VENOUS BLD VENIPUNCTURE: CPT

## 2022-01-28 PROCEDURE — 81003 URINALYSIS AUTO W/O SCOPE: CPT

## 2022-01-28 PROCEDURE — 80053 COMPREHEN METABOLIC PANEL: CPT

## 2022-01-28 PROCEDURE — 87081 CULTURE SCREEN ONLY: CPT

## 2022-01-28 PROCEDURE — 85025 COMPLETE CBC W/AUTO DIFF WBC: CPT

## 2022-01-28 PROCEDURE — 85610 PROTHROMBIN TIME: CPT

## 2022-01-28 PROCEDURE — 87088 URINE BACTERIA CULTURE: CPT

## 2022-01-28 PROCEDURE — 84134 ASSAY OF PREALBUMIN: CPT

## 2022-01-28 PROCEDURE — 85730 THROMBOPLASTIN TIME PARTIAL: CPT

## 2022-01-28 PROCEDURE — 83036 HEMOGLOBIN GLYCOSYLATED A1C: CPT

## 2022-01-28 RX ORDER — SODIUM CHLORIDE, SODIUM LACTATE, POTASSIUM CHLORIDE, CALCIUM CHLORIDE 600; 310; 30; 20 MG/100ML; MG/100ML; MG/100ML; MG/100ML
INJECTION, SOLUTION INTRAVENOUS CONTINUOUS
Status: CANCELLED | OUTPATIENT
Start: 2022-01-28

## 2022-01-28 ASSESSMENT — PAIN DESCRIPTION - PAIN TYPE: TYPE: CHRONIC PAIN

## 2022-01-28 ASSESSMENT — PAIN DESCRIPTION - DESCRIPTORS: DESCRIPTORS: ACHING;SORE

## 2022-01-28 ASSESSMENT — PAIN DESCRIPTION - LOCATION: LOCATION: HIP

## 2022-01-28 ASSESSMENT — PAIN DESCRIPTION - ORIENTATION: ORIENTATION: RIGHT

## 2022-01-28 ASSESSMENT — PAIN SCALES - GENERAL: PAINLEVEL_OUTOF10: 1

## 2022-01-28 NOTE — PROGRESS NOTES
[address] [facility]                                                                                                                    PRE OP INSTRUCTIONS FOR  Vinay Mejia        Date: 1/28/2022    Date of surgery: 2/2/22   Arrival Time: Hospital will call you between 5pm and 7pm the evening before with your final arrival time for surgery    1. Do not eat or drink anything after midnight prior to surgery. This includes no water, chewing gum, mints or ice chips. 2. Take the following medications with a small sip of water on the morning of Surgery: none     3. Diabetics may take evening dose of insulin but none after midnight. If you feel symptomatic or low blood sugar morning of surgery drink 1-2 ounces of apple juice only. 4. Aspirin, Ibuprofen, Advil, Naproxen, Vitamin E and other Anti-inflammatory products should be stopped  before surgery  as directed by your physician. Take Tylenol only unless instructed otherwise by your surgeon. 5. Check with your Doctor regarding stopping Plavix, Coumadin, Lovenox, Eliquis, Effient, or other blood thinners. 6. Do not smoke,use illicit drugs and do not drink any alcoholic beverages 24 hours prior to surgery. 7. You may brush your teeth the morning of surgery. DO NOT SWALLOW WATER    8. You MUST make arrangements for a responsible adult to take you home after your surgery. You will not be allowed to leave alone or drive yourself home. It is strongly suggested someone stay with you the first 24 hrs. Your surgery will be cancelled if you do not have a ride home. 9. PEDIATRIC PATIENTS ONLY:  A parent/legal guardian must accompany a child scheduled for surgery and plan to stay at the hospital until the child is discharged. Please do not bring other children with you.     10. Please wear simple, loose fitting clothing to the hospital.  Fartun He not bring valuables (money, credit cards, checkbooks, etc.) Do not wear any makeup (including no eye

## 2022-01-28 NOTE — PROGRESS NOTES
Patient attended preoperative Total Joint Camp on 1/28/2022. Patient is scheduled to have an elective hip replacement. Patient was educated regarding Disease Process, Medications, Smoking Cessation, Oxygenation, Incentive Spirometry and Deep Breath and Cough, signs and symptoms of postoperative joint infection that include: Fever, Chills, Pain Control, Drainage and Redness, post-op follow up with orthopaedic surgeon, dressing removal, staple removal, ambulatory devices which include a wheeled walker and cane, bed mobility, correct anatomical alignment, active range of motion, proper transferring technique, incision care, infection prevention measures, non-pharmacologic comfort measures, notification of inadequate pain control measures, pain scale for assessing level of pain, pharmacologic pain management, relaxation techniques.

## 2022-01-30 LAB
MRSA CULTURE ONLY: NORMAL
URINE CULTURE, ROUTINE: NORMAL

## 2022-02-01 ENCOUNTER — ANESTHESIA EVENT (OUTPATIENT)
Dept: OPERATING ROOM | Age: 77
End: 2022-02-01
Payer: MEDICARE

## 2022-02-02 ENCOUNTER — ANESTHESIA (OUTPATIENT)
Dept: OPERATING ROOM | Age: 77
End: 2022-02-02
Payer: MEDICARE

## 2022-02-02 ENCOUNTER — HOSPITAL ENCOUNTER (OUTPATIENT)
Age: 77
Discharge: HOME | End: 2022-02-02
Attending: ORTHOPAEDIC SURGERY | Admitting: ORTHOPAEDIC SURGERY
Payer: MEDICARE

## 2022-02-02 ENCOUNTER — APPOINTMENT (OUTPATIENT)
Dept: GENERAL RADIOLOGY | Age: 77
End: 2022-02-02
Attending: ORTHOPAEDIC SURGERY
Payer: MEDICARE

## 2022-02-02 VITALS
RESPIRATION RATE: 24 BRPM | OXYGEN SATURATION: 100 % | TEMPERATURE: 97.3 F | DIASTOLIC BLOOD PRESSURE: 60 MMHG | SYSTOLIC BLOOD PRESSURE: 83 MMHG

## 2022-02-02 VITALS
RESPIRATION RATE: 16 BRPM | TEMPERATURE: 96.5 F | HEART RATE: 68 BPM | OXYGEN SATURATION: 97 % | DIASTOLIC BLOOD PRESSURE: 75 MMHG | SYSTOLIC BLOOD PRESSURE: 118 MMHG

## 2022-02-02 DIAGNOSIS — M16.11 PRIMARY OSTEOARTHRITIS OF RIGHT HIP: Primary | ICD-10-CM

## 2022-02-02 DIAGNOSIS — Z98.890 POST-OPERATIVE STATE: ICD-10-CM

## 2022-02-02 PROCEDURE — 27130 TOTAL HIP ARTHROPLASTY: CPT | Performed by: ORTHOPAEDIC SURGERY

## 2022-02-02 PROCEDURE — 6370000000 HC RX 637 (ALT 250 FOR IP): Performed by: NURSE PRACTITIONER

## 2022-02-02 PROCEDURE — 2580000003 HC RX 258: Performed by: ANESTHESIOLOGY

## 2022-02-02 PROCEDURE — 6360000002 HC RX W HCPCS: Performed by: NURSE PRACTITIONER

## 2022-02-02 PROCEDURE — A4217 STERILE WATER/SALINE, 500 ML: HCPCS | Performed by: ORTHOPAEDIC SURGERY

## 2022-02-02 PROCEDURE — 2580000003 HC RX 258: Performed by: NURSE ANESTHETIST, CERTIFIED REGISTERED

## 2022-02-02 PROCEDURE — 7100000001 HC PACU RECOVERY - ADDTL 15 MIN: Performed by: ORTHOPAEDIC SURGERY

## 2022-02-02 PROCEDURE — 6370000000 HC RX 637 (ALT 250 FOR IP): Performed by: ORTHOPAEDIC SURGERY

## 2022-02-02 PROCEDURE — 2709999900 HC NON-CHARGEABLE SUPPLY: Performed by: ORTHOPAEDIC SURGERY

## 2022-02-02 PROCEDURE — 97530 THERAPEUTIC ACTIVITIES: CPT | Performed by: PHYSICAL THERAPIST

## 2022-02-02 PROCEDURE — 2500000003 HC RX 250 WO HCPCS: Performed by: ORTHOPAEDIC SURGERY

## 2022-02-02 PROCEDURE — 73502 X-RAY EXAM HIP UNI 2-3 VIEWS: CPT

## 2022-02-02 PROCEDURE — 3700000001 HC ADD 15 MINUTES (ANESTHESIA): Performed by: ORTHOPAEDIC SURGERY

## 2022-02-02 PROCEDURE — 97110 THERAPEUTIC EXERCISES: CPT | Performed by: PHYSICAL THERAPIST

## 2022-02-02 PROCEDURE — 2500000003 HC RX 250 WO HCPCS: Performed by: NURSE ANESTHETIST, CERTIFIED REGISTERED

## 2022-02-02 PROCEDURE — 88304 TISSUE EXAM BY PATHOLOGIST: CPT

## 2022-02-02 PROCEDURE — 2500000003 HC RX 250 WO HCPCS: Performed by: NURSE PRACTITIONER

## 2022-02-02 PROCEDURE — 2580000003 HC RX 258: Performed by: ORTHOPAEDIC SURGERY

## 2022-02-02 PROCEDURE — 7100000010 HC PHASE II RECOVERY - FIRST 15 MIN: Performed by: ORTHOPAEDIC SURGERY

## 2022-02-02 PROCEDURE — 6360000002 HC RX W HCPCS

## 2022-02-02 PROCEDURE — 3700000000 HC ANESTHESIA ATTENDED CARE: Performed by: ORTHOPAEDIC SURGERY

## 2022-02-02 PROCEDURE — 97161 PT EVAL LOW COMPLEX 20 MIN: CPT | Performed by: PHYSICAL THERAPIST

## 2022-02-02 PROCEDURE — 97530 THERAPEUTIC ACTIVITIES: CPT

## 2022-02-02 PROCEDURE — 97165 OT EVAL LOW COMPLEX 30 MIN: CPT

## 2022-02-02 PROCEDURE — 2580000003 HC RX 258: Performed by: NURSE PRACTITIONER

## 2022-02-02 PROCEDURE — 88311 DECALCIFY TISSUE: CPT

## 2022-02-02 PROCEDURE — 6360000002 HC RX W HCPCS: Performed by: NURSE ANESTHETIST, CERTIFIED REGISTERED

## 2022-02-02 PROCEDURE — 7100000000 HC PACU RECOVERY - FIRST 15 MIN: Performed by: ORTHOPAEDIC SURGERY

## 2022-02-02 PROCEDURE — 3600000005 HC SURGERY LEVEL 5 BASE: Performed by: ORTHOPAEDIC SURGERY

## 2022-02-02 PROCEDURE — 6360000002 HC RX W HCPCS: Performed by: ORTHOPAEDIC SURGERY

## 2022-02-02 PROCEDURE — 3600000015 HC SURGERY LEVEL 5 ADDTL 15MIN: Performed by: ORTHOPAEDIC SURGERY

## 2022-02-02 PROCEDURE — 7100000011 HC PHASE II RECOVERY - ADDTL 15 MIN: Performed by: ORTHOPAEDIC SURGERY

## 2022-02-02 PROCEDURE — 97535 SELF CARE MNGMENT TRAINING: CPT

## 2022-02-02 PROCEDURE — C1776 JOINT DEVICE (IMPLANTABLE): HCPCS | Performed by: ORTHOPAEDIC SURGERY

## 2022-02-02 DEVICE — STEM FEM SZ 8 L111MM 12/14 TAPR HI OFFSET HIP DUOFIX CLLRD: Type: IMPLANTABLE DEVICE | Site: HIP | Status: FUNCTIONAL

## 2022-02-02 DEVICE — HEAD FEM MOD 12/14 +8.5 HIP M-SPECIFICATION TAPR COCR: Type: IMPLANTABLE DEVICE | Site: HIP | Status: FUNCTIONAL

## 2022-02-02 DEVICE — CUP ACET DIA56MM HIP GRIPTION PRI CEMENTLESS FIX SECT SER: Type: IMPLANTABLE DEVICE | Site: HIP | Status: FUNCTIONAL

## 2022-02-02 DEVICE — HIP H1 TOT STD COCR HD IMPL CAPPED SYNTHES: Type: IMPLANTABLE DEVICE | Site: HIP | Status: FUNCTIONAL

## 2022-02-02 DEVICE — SCREW BNE L40MM DIA6.5MM CANC HIP DOME PINN: Type: IMPLANTABLE DEVICE | Site: HIP | Status: FUNCTIONAL

## 2022-02-02 DEVICE — LINER ALTRX NEUT 40IDX56OD: Type: IMPLANTABLE DEVICE | Site: HIP | Status: FUNCTIONAL

## 2022-02-02 RX ORDER — VANCOMYCIN HYDROCHLORIDE 1 G/20ML
INJECTION, POWDER, LYOPHILIZED, FOR SOLUTION INTRAVENOUS PRN
Status: DISCONTINUED | OUTPATIENT
Start: 2022-02-02 | End: 2022-02-02 | Stop reason: ALTCHOICE

## 2022-02-02 RX ORDER — MEPERIDINE HYDROCHLORIDE 25 MG/ML
12.5 INJECTION INTRAMUSCULAR; INTRAVENOUS; SUBCUTANEOUS EVERY 5 MIN PRN
Status: DISCONTINUED | OUTPATIENT
Start: 2022-02-02 | End: 2022-02-02

## 2022-02-02 RX ORDER — SODIUM CHLORIDE 0.9 % (FLUSH) 0.9 %
5-40 SYRINGE (ML) INJECTION EVERY 12 HOURS SCHEDULED
Status: DISCONTINUED | OUTPATIENT
Start: 2022-02-02 | End: 2022-02-02 | Stop reason: HOSPADM

## 2022-02-02 RX ORDER — SODIUM CHLORIDE 9 MG/ML
25 INJECTION, SOLUTION INTRAVENOUS PRN
Status: DISCONTINUED | OUTPATIENT
Start: 2022-02-02 | End: 2022-02-02 | Stop reason: HOSPADM

## 2022-02-02 RX ORDER — SODIUM CHLORIDE 0.9 % (FLUSH) 0.9 %
5-40 SYRINGE (ML) INJECTION EVERY 12 HOURS SCHEDULED
Status: DISCONTINUED | OUTPATIENT
Start: 2022-02-02 | End: 2022-02-02

## 2022-02-02 RX ORDER — SODIUM CHLORIDE 0.9 % (FLUSH) 0.9 %
5-40 SYRINGE (ML) INJECTION PRN
Status: DISCONTINUED | OUTPATIENT
Start: 2022-02-02 | End: 2022-02-02

## 2022-02-02 RX ORDER — CELECOXIB 100 MG/1
100 CAPSULE ORAL ONCE
Status: COMPLETED | OUTPATIENT
Start: 2022-02-02 | End: 2022-02-02

## 2022-02-02 RX ORDER — ACETAMINOPHEN 325 MG/1
650 TABLET ORAL EVERY 6 HOURS
Status: DISCONTINUED | OUTPATIENT
Start: 2022-02-02 | End: 2022-02-02 | Stop reason: HOSPADM

## 2022-02-02 RX ORDER — OXYCODONE HYDROCHLORIDE 5 MG/1
10 TABLET ORAL EVERY 4 HOURS PRN
Status: DISCONTINUED | OUTPATIENT
Start: 2022-02-02 | End: 2022-02-02 | Stop reason: HOSPADM

## 2022-02-02 RX ORDER — CEFAZOLIN SODIUM 2 G/50ML
2000 SOLUTION INTRAVENOUS
Status: COMPLETED | OUTPATIENT
Start: 2022-02-02 | End: 2022-02-02

## 2022-02-02 RX ORDER — FENTANYL CITRATE 50 UG/ML
INJECTION, SOLUTION INTRAMUSCULAR; INTRAVENOUS PRN
Status: DISCONTINUED | OUTPATIENT
Start: 2022-02-02 | End: 2022-02-02 | Stop reason: SDUPTHER

## 2022-02-02 RX ORDER — MIDAZOLAM HYDROCHLORIDE 1 MG/ML
INJECTION INTRAMUSCULAR; INTRAVENOUS PRN
Status: DISCONTINUED | OUTPATIENT
Start: 2022-02-02 | End: 2022-02-02 | Stop reason: SDUPTHER

## 2022-02-02 RX ORDER — DEXTROSE AND SODIUM CHLORIDE 5; .45 G/100ML; G/100ML
INJECTION, SOLUTION INTRAVENOUS CONTINUOUS
Status: DISCONTINUED | OUTPATIENT
Start: 2022-02-02 | End: 2022-02-02 | Stop reason: HOSPADM

## 2022-02-02 RX ORDER — GABAPENTIN 100 MG/1
100 CAPSULE ORAL 3 TIMES DAILY
Qty: 90 CAPSULE | Refills: 0 | Status: SHIPPED | OUTPATIENT
Start: 2022-02-02 | End: 2022-03-04

## 2022-02-02 RX ORDER — SODIUM CHLORIDE 9 MG/ML
25 INJECTION, SOLUTION INTRAVENOUS PRN
Status: DISCONTINUED | OUTPATIENT
Start: 2022-02-02 | End: 2022-02-02

## 2022-02-02 RX ORDER — PROPOFOL 10 MG/ML
INJECTION, EMULSION INTRAVENOUS CONTINUOUS PRN
Status: DISCONTINUED | OUTPATIENT
Start: 2022-02-02 | End: 2022-02-02 | Stop reason: SDUPTHER

## 2022-02-02 RX ORDER — ACETAMINOPHEN 500 MG
1000 TABLET ORAL ONCE
Status: COMPLETED | OUTPATIENT
Start: 2022-02-02 | End: 2022-02-02

## 2022-02-02 RX ORDER — CEFAZOLIN SODIUM 2 G/50ML
2000 SOLUTION INTRAVENOUS EVERY 8 HOURS
Status: DISCONTINUED | OUTPATIENT
Start: 2022-02-02 | End: 2022-02-02 | Stop reason: HOSPADM

## 2022-02-02 RX ORDER — ONDANSETRON 4 MG/1
4 TABLET, ORALLY DISINTEGRATING ORAL EVERY 8 HOURS PRN
Status: DISCONTINUED | OUTPATIENT
Start: 2022-02-02 | End: 2022-02-02 | Stop reason: HOSPADM

## 2022-02-02 RX ORDER — OXYCODONE AND ACETAMINOPHEN 10; 325 MG/1; MG/1
1 TABLET ORAL EVERY 6 HOURS PRN
Qty: 28 TABLET | Refills: 0 | Status: SHIPPED | OUTPATIENT
Start: 2022-02-02 | End: 2022-02-09

## 2022-02-02 RX ORDER — ONDANSETRON 2 MG/ML
4 INJECTION INTRAMUSCULAR; INTRAVENOUS EVERY 6 HOURS PRN
Status: DISCONTINUED | OUTPATIENT
Start: 2022-02-02 | End: 2022-02-02 | Stop reason: HOSPADM

## 2022-02-02 RX ORDER — SODIUM CHLORIDE, SODIUM LACTATE, POTASSIUM CHLORIDE, CALCIUM CHLORIDE 600; 310; 30; 20 MG/100ML; MG/100ML; MG/100ML; MG/100ML
INJECTION, SOLUTION INTRAVENOUS CONTINUOUS
Status: DISCONTINUED | OUTPATIENT
Start: 2022-02-02 | End: 2022-02-02

## 2022-02-02 RX ORDER — CEFAZOLIN SODIUM 2 G/50ML
SOLUTION INTRAVENOUS
Status: COMPLETED
Start: 2022-02-02 | End: 2022-02-02

## 2022-02-02 RX ORDER — CELECOXIB 100 MG/1
100 CAPSULE ORAL 2 TIMES DAILY
Qty: 60 CAPSULE | Refills: 0 | Status: SHIPPED | OUTPATIENT
Start: 2022-02-02

## 2022-02-02 RX ORDER — SODIUM CHLORIDE 0.9 % (FLUSH) 0.9 %
5-40 SYRINGE (ML) INJECTION PRN
Status: DISCONTINUED | OUTPATIENT
Start: 2022-02-02 | End: 2022-02-02 | Stop reason: HOSPADM

## 2022-02-02 RX ORDER — SODIUM CHLORIDE, SODIUM LACTATE, POTASSIUM CHLORIDE, CALCIUM CHLORIDE 600; 310; 30; 20 MG/100ML; MG/100ML; MG/100ML; MG/100ML
INJECTION, SOLUTION INTRAVENOUS CONTINUOUS PRN
Status: DISCONTINUED | OUTPATIENT
Start: 2022-02-02 | End: 2022-02-02 | Stop reason: SDUPTHER

## 2022-02-02 RX ORDER — KETAMINE HYDROCHLORIDE 50 MG/ML
INJECTION, SOLUTION, CONCENTRATE INTRAMUSCULAR; INTRAVENOUS PRN
Status: DISCONTINUED | OUTPATIENT
Start: 2022-02-02 | End: 2022-02-02 | Stop reason: SDUPTHER

## 2022-02-02 RX ORDER — DEXAMETHASONE SODIUM PHOSPHATE 10 MG/ML
8 INJECTION INTRAMUSCULAR; INTRAVENOUS ONCE
Status: COMPLETED | OUTPATIENT
Start: 2022-02-02 | End: 2022-02-02

## 2022-02-02 RX ORDER — PRAVASTATIN SODIUM 20 MG
20 TABLET ORAL NIGHTLY
Status: DISCONTINUED | OUTPATIENT
Start: 2022-02-02 | End: 2022-02-02 | Stop reason: HOSPADM

## 2022-02-02 RX ORDER — SODIUM CHLORIDE, SODIUM LACTATE, POTASSIUM CHLORIDE, CALCIUM CHLORIDE 600; 310; 30; 20 MG/100ML; MG/100ML; MG/100ML; MG/100ML
INJECTION, SOLUTION INTRAVENOUS CONTINUOUS PRN
Status: DISCONTINUED | OUTPATIENT
Start: 2022-02-02 | End: 2022-02-02

## 2022-02-02 RX ORDER — DIPHENHYDRAMINE HYDROCHLORIDE 50 MG/ML
INJECTION INTRAMUSCULAR; INTRAVENOUS PRN
Status: DISCONTINUED | OUTPATIENT
Start: 2022-02-02 | End: 2022-02-02 | Stop reason: SDUPTHER

## 2022-02-02 RX ORDER — MORPHINE SULFATE 2 MG/ML
2 INJECTION, SOLUTION INTRAMUSCULAR; INTRAVENOUS
Status: DISCONTINUED | OUTPATIENT
Start: 2022-02-02 | End: 2022-02-02 | Stop reason: HOSPADM

## 2022-02-02 RX ORDER — PREGABALIN 75 MG/1
75 CAPSULE ORAL ONCE
Status: COMPLETED | OUTPATIENT
Start: 2022-02-02 | End: 2022-02-02

## 2022-02-02 RX ORDER — LIDOCAINE HYDROCHLORIDE 20 MG/ML
INJECTION, SOLUTION INTRAVENOUS PRN
Status: DISCONTINUED | OUTPATIENT
Start: 2022-02-02 | End: 2022-02-02 | Stop reason: SDUPTHER

## 2022-02-02 RX ORDER — MELOXICAM 7.5 MG/1
3.75 TABLET ORAL DAILY
Status: DISCONTINUED | OUTPATIENT
Start: 2022-02-02 | End: 2022-02-02 | Stop reason: HOSPADM

## 2022-02-02 RX ADMIN — DIPHENHYDRAMINE HYDROCHLORIDE 50 MG: 50 INJECTION, SOLUTION INTRAMUSCULAR; INTRAVENOUS at 07:02

## 2022-02-02 RX ADMIN — CEFAZOLIN SODIUM 2000 MG: 2 SOLUTION INTRAVENOUS at 07:13

## 2022-02-02 RX ADMIN — KETAMINE HYDROCHLORIDE 10 MG: 50 INJECTION INTRAMUSCULAR; INTRAVENOUS at 07:32

## 2022-02-02 RX ADMIN — PREGABALIN 75 MG: 75 CAPSULE ORAL at 05:33

## 2022-02-02 RX ADMIN — SODIUM CHLORIDE, POTASSIUM CHLORIDE, SODIUM LACTATE AND CALCIUM CHLORIDE: 600; 310; 30; 20 INJECTION, SOLUTION INTRAVENOUS at 06:50

## 2022-02-02 RX ADMIN — LIDOCAINE HYDROCHLORIDE 40 MG: 20 INJECTION, SOLUTION INTRAVENOUS at 07:17

## 2022-02-02 RX ADMIN — PHENYLEPHRINE HYDROCHLORIDE 100 MCG: 10 INJECTION INTRAVENOUS at 08:12

## 2022-02-02 RX ADMIN — FENTANYL CITRATE 25 MCG: 50 INJECTION, SOLUTION INTRAMUSCULAR; INTRAVENOUS at 08:00

## 2022-02-02 RX ADMIN — CELECOXIB 100 MG: 100 CAPSULE ORAL at 05:33

## 2022-02-02 RX ADMIN — KETAMINE HYDROCHLORIDE 10 MG: 50 INJECTION INTRAMUSCULAR; INTRAVENOUS at 08:07

## 2022-02-02 RX ADMIN — SODIUM CHLORIDE, POTASSIUM CHLORIDE, SODIUM LACTATE AND CALCIUM CHLORIDE: 600; 310; 30; 20 INJECTION, SOLUTION INTRAVENOUS at 08:14

## 2022-02-02 RX ADMIN — FENTANYL CITRATE 25 MCG: 50 INJECTION, SOLUTION INTRAMUSCULAR; INTRAVENOUS at 07:09

## 2022-02-02 RX ADMIN — MIDAZOLAM 2 MG: 1 INJECTION INTRAMUSCULAR; INTRAVENOUS at 07:02

## 2022-02-02 RX ADMIN — FENTANYL CITRATE 25 MCG: 50 INJECTION, SOLUTION INTRAMUSCULAR; INTRAVENOUS at 08:24

## 2022-02-02 RX ADMIN — PHENYLEPHRINE HYDROCHLORIDE 100 MCG: 10 INJECTION INTRAVENOUS at 08:24

## 2022-02-02 RX ADMIN — ACETAMINOPHEN 1000 MG: 500 TABLET ORAL at 05:33

## 2022-02-02 RX ADMIN — KETAMINE HYDROCHLORIDE 20 MG: 50 INJECTION INTRAMUSCULAR; INTRAVENOUS at 07:17

## 2022-02-02 RX ADMIN — CEFAZOLIN SODIUM 2000 MG: 2 SOLUTION INTRAVENOUS at 10:51

## 2022-02-02 RX ADMIN — SODIUM CHLORIDE, POTASSIUM CHLORIDE, SODIUM LACTATE AND CALCIUM CHLORIDE: 600; 310; 30; 20 INJECTION, SOLUTION INTRAVENOUS at 05:33

## 2022-02-02 RX ADMIN — PHENYLEPHRINE HYDROCHLORIDE 100 MCG: 10 INJECTION INTRAVENOUS at 08:36

## 2022-02-02 RX ADMIN — FENTANYL CITRATE 25 MCG: 50 INJECTION, SOLUTION INTRAMUSCULAR; INTRAVENOUS at 07:27

## 2022-02-02 RX ADMIN — MELOXICAM 3.75 MG: 7.5 TABLET ORAL at 12:49

## 2022-02-02 RX ADMIN — PHENYLEPHRINE HYDROCHLORIDE 100 MCG: 10 INJECTION INTRAVENOUS at 08:41

## 2022-02-02 RX ADMIN — PHENYLEPHRINE HYDROCHLORIDE 100 MCG: 10 INJECTION INTRAVENOUS at 07:53

## 2022-02-02 RX ADMIN — DEXAMETHASONE SODIUM PHOSPHATE 8 MG: 10 INJECTION INTRAMUSCULAR; INTRAVENOUS at 05:33

## 2022-02-02 RX ADMIN — KETAMINE HYDROCHLORIDE 10 MG: 50 INJECTION INTRAMUSCULAR; INTRAVENOUS at 08:30

## 2022-02-02 RX ADMIN — PROPOFOL 50 MCG/KG/MIN: 10 INJECTION, EMULSION INTRAVENOUS at 07:17

## 2022-02-02 ASSESSMENT — PULMONARY FUNCTION TESTS
PIF_VALUE: 0
PIF_VALUE: 1
PIF_VALUE: 0
PIF_VALUE: 1
PIF_VALUE: 0
PIF_VALUE: 2
PIF_VALUE: 0
PIF_VALUE: 2
PIF_VALUE: 0
PIF_VALUE: 1
PIF_VALUE: 0

## 2022-02-02 ASSESSMENT — PAIN SCALES - GENERAL
PAINLEVEL_OUTOF10: 0

## 2022-02-02 ASSESSMENT — PAIN - FUNCTIONAL ASSESSMENT: PAIN_FUNCTIONAL_ASSESSMENT: 0-10

## 2022-02-02 NOTE — ANESTHESIA PROCEDURE NOTES
Spinal Block    Start time: 2/2/2022 7:05 AM  End time: 2/2/2022 7:09 AM  Reason for block: primary anesthetic  Staffing  Performed: resident/CRNA and other anesthesia staff   Anesthesiologist: Jarad Del Real MD  Resident/CRNA: SUE Loya - CRNA  Other anesthesia staff: Urvashi Aranda RN  Preanesthetic Checklist  Completed: patient identified, IV checked, site marked, risks and benefits discussed, surgical consent, monitors and equipment checked, pre-op evaluation, timeout performed, anesthesia consent given, oxygen available and patient being monitored  Spinal Block  Patient position: sitting  Prep: Betadine  Patient monitoring: continuous pulse ox, cardiac monitor, frequent blood pressure checks and continuous capnometry  Approach: midline  Location: L4/L5  Provider prep: mask and sterile gloves  Dose: 0.5  Agent: bupivacaine  Adjuvant: fentanyl  Dose: 2  Dose: 2  Needle  Needle type: Sprotte Tip   Needle gauge: 24 G  Needle length: 6 in  Assessment  Sensory level: T6  Swirl obtained: Yes  CSF: clear  Attempts: 1  Hemodynamics: stable

## 2022-02-02 NOTE — PROGRESS NOTES
Physical Therapy Initial Evaluation/Plan of Care    Room #:  OR POOL/NONE  Patient Name: Snow Sesay  YOB: 1945  MRN: [id number]    Date of Service: 2/2/2022     Tentative placement recommendation: Home Health PT 5 days a week   Equipment recommendation: Patient has needed equipment       Evaluating Physical Therapist: Blain Rinne, [place] #[id number]     Specific Provider Orders/Date/Referring Provider :  02/02/22 0715   PT eval and treat Start: 02/02/22 0715, End: 02/02/22 0715, ONE TIME, Standing Count: 1 Occurrences, R    Garo Spence DO      Admitting Diagnosis:   Primary osteoarthritis of right hip [M16.11]   Visit diagnosis:   Visit Diagnoses       Codes    Post-operative state     Z98.890        Surgery:     right Total hip arthroplasty (DENIZ)    Patient Active Problem List   Diagnosis    Primary osteoarthritis of right hip       ASSESSMENT of Current Deficits  Safe for discharge home with supervision. PHYSICAL THERAPY  PLAN OF CARE     Discharge skilled Physical Therapy. Patient and or family understand(s) diagnosis, prognosis, and plan of care. Prior Level of Function: Patient ambulated independently goes go gym  Rehab Potential: good    for baseline    Past medical history:   Past Medical History:   Diagnosis Date    COVID-19 12/2021     Past Surgical History:   Procedure Laterality Date    COLONOSCOPY           SUBJECTIVE:    Precautions:   , falls ,right lower extremity FWB (full weight bearing)  tremors-history of     Social history: Patient lives alone in a ranch home  with 2 steps  to enter bilateral Rail  Walk in shower grab bars daughter staying as long as needed  Equipment owned: Mitchel Ledezmah,       [address] cleared patient for PT evaluation. The admitting diagnosis and active problem list as listed above have been reviewed prior to the initiation of this evaluation. OBJECTIVE:  Was pt agreeable to Eval/treatment?  Yes Pain Level  0-2/10   Right hip     Bed Mobility  Rolling: Not assessed     Supine to sit: Supervision     Sit to supine: Supervision     Scooting: Supervision      Transfers Sit to stand: Supervision  Cues for hand placement and safety Right lower extremity tends to internally rotate   Ambulation    2 x 30 feet, 1 x 130 feet, 1 x 40 feet using  wheeled walker with Minimal assist of 1   progressing to supervision, Patient with impaired proprioception, slight circumduction gait and cues for sequencing, safety and right knee and knee flexion   Stair negotiation: ascended and descended   2 x 5 steps  bilateral rail with supervision  Cues for rep patient able to perform safely 2nd rep without cueing    ROM Within functional limits except Right hip within precautions     Strength Within functional limits  except  Right lower extremity 3-/5   Balance Sitting EOB:  good  -  Dynamic Standing:  fair  +  wheeled walker      Patient is Alert & Oriented x person, place, time and situation and follows directions    Sensation:  Patient reports numbness/tingling bilateral feet    Edema:  none noted   Vitals:  Blood Pressure at rest   Blood Pressure during session     Heart Rate at rest 52 Heart Rate during session     SPO2 at rest 95%  SPO2 during session  %     Patient education  Patient educated on role of Physical Therapy, risks of immobility, safety and plan of care,  importance of mobility while in hospital , ankle pumps, quad set and glut set for edema control, blood clot prevention, hip precautions, stair training  and weight bearing status       Patient response to education:   Pt verbalized understanding Pt demonstrated skill Pt requires further education in this area   Yes Partial Yes       Treatment:  Patient practiced and was instructed in the following treatment:     Therapist educated and facilitated patient on techniques to increase safety and independence with bed mobility, balance, functional transfers, and functional mobility. Patient ambulated to bathroom, stood at commode and sink to wash hands with supervision for balance and safety. Assisted to bed, performed exercises. Patient performed ankle pumps, quad sets, glut sets x  20 each. Active assist heelslide, hip abduction/adduction, straight leg raise x 20 each     ambulated in PT gym johnson, performed stairs. Instruction in hip precautions; no > 90*, crossing midline or pivoting/internal rotation  Instruction and performance of incentive inspirometer. At end of session, patient in care of Occupational Therapy with daughter present call light and phone within reach,   all lines and tubes intact, nursing notified. Patient would benefit from skilled Home Physical Therapy to improve functional independence and quality of life. Patient's/ family goals   home today    Patient and or family understand(s) diagnosis, prognosis, and plan of care. Time in  1410  Time out  1458    Total Treatment Time  28 minutes    Evaluation time includes thorough review of current medical information, gathering information on past medical history/social history and prior level of function, completion of standardized testing/informal observation of tasks, assessment of data, and development of Plan of care and goals.      CPT codes:  Low Complexity PT evaluation (87634)  Therapeutic activities (19299)   15 minutes  1 unit(s)  Therapeutic exercises (13260)   13 minutes  1 unit(s)    Peyton Lockett, PT

## 2022-02-02 NOTE — CARE COORDINATION
2/2/2022: SS Note/Discharge planning:  SS Consult noted for post-op d/c planning, pt returned to [facility] and plans to go directly home with [facility], agency has home care orders and will call pt to arrange home therapy visits for [facility] tomorrow, pt has dme needed, [facility] nurse notified. Electronically signed by JEN Lindsey on 2/2/2022 at 12:38 PM

## 2022-02-02 NOTE — ANESTHESIA POSTPROCEDURE EVALUATION
Department of Anesthesiology  Postprocedure Note    Patient: Ashley Conley  MRN: [id number]  YOB: 1945  Date of evaluation: 2/2/2022  Time:  11:58 AM     Procedure Summary     Date: 02/02/22 Room / Location: [address] [place] [address] / [address] [place] [address]    Anesthesia Start: 5466 Anesthesia Stop: 1429    Procedure: RIGHT HIP TOTAL ARTHROPLASTY (DEPUY) (Right ) Diagnosis: (RIGHT HIP DJD)    Surgeons: Deonna Varner DO Responsible Provider: Bryan Fernandez MD    Anesthesia Type: spinal ASA Status: 2          Anesthesia Type: spinal    Keshav Phase I: Keshav Score: 10    Keshav Phase II: Keshav Score: 10    Last vitals: Reviewed and per EMR flowsheets.        Anesthesia Post Evaluation    Patient location during evaluation: PACU  Patient participation: complete - patient participated  Level of consciousness: awake  Pain score: 0  Airway patency: patent  Nausea & Vomiting: no nausea  Complications: no  Cardiovascular status: blood pressure returned to baseline  Respiratory status: acceptable  Hydration status: euvolemic

## 2022-02-02 NOTE — OP NOTE
Operative Note      Patient: Myke Vargas  YOB: 1945  MRN: [id number]    Date of Procedure: 2/2/2022    Pre-Op Diagnosis: RIGHT HIP DJD    Post-Op Diagnosis: Same       Procedure(s):  RIGHT HIP TOTAL ARTHROPLASTY (DigicompanionUY)    Surgeon(s): Aparna Aranda DO    Assistant:   Resident: Ronit Pereyra DO; Anurag Sanchez DO; Nick Hughes DO    Anesthesia: Spinal    Estimated Blood Loss (mL): 552     Complications: None    Specimens:   ID Type Source Tests Collected by Time Destination   A : bone right hip Tissue Tissue SURGICAL PATHOLOGY Aparna Aranda DO 2/2/2022 4791        Implants:  Implant Name Type Inv.  Item Serial No.  Lot No. LRB No. Used Action   CUP ACET [id number] HIP GRIPTION ALESSANDRA CEMENTLESS FIX SECT SER  CUP ACET [id number] HIP GRIPTION ALESSANDRA CEMENTLESS FIX SECT SER  [facility] [id number] Right 1 Implanted   SCREW BNE L40MM DIA6.5MM CANC HIP DOME PINN  SCREW BNE L40MM DIA6.5MM CANC HIP DOME PINN  [facility] [id number] Right 1 Implanted   STEM FEM SZ 8 L111MM 12/14 TAPR HI OFFSET HIP DUOFIX CLLRD  STEM FEM SZ 8 L111MM 12/14 TAPR HI OFFSET [facility]IP [facility]  [facility] [id number] Right 1 Implanted   ALTRX NEUT [id number]  ALTRX NEUT [id number]  [facility] [id number] Right 1 Implanted   HEAD FEM MOD 12/14 +8.5 HIP M-SPECIFICATION TAPR COCR  HEAD FEM MOD 12/14 +8.5 HIP M-SPECIFICATION TAPR COCR  [facility] [id number] Right 1 Implanted         Drains: * No LDAs found *    Findings: as above    Detailed Description of Procedure:   below    Department of Orthopedic Surgery  Operative Report        Pre-operative Diagnosis:  Right Hip Osteoarthritis    Post-operative Diagnosis:  Right Hip Osteoarthritis    Procedure:  Right Hip Arthroplasty    Surgeon:  Alexander Borges DO     Assistant(s):  As above    Anesthesia:  Spinal anesthesia    Estimated blood loss:  200 ml    Specimens:  As above    Implants:  As above    Complications:  none    Condition:  Stable    Brief Hospital Course:  Marisol Weinstein is a patient known to [facility] DO practice with persistent complaints of Right hip pain. Hip pain has failed to be relieved by non-operative conservative measures, and has began affecting daily activities of living. After examination of the patient, review of the radiologic studies, and appropriate pre-operative risk assessment, Perry Saha DO recommended Right hip arthroplasty, which the patient was agreeable towards. Operative Course: The patient seen and identified outside the operative suite. Operative site was marked as appropriate by patient, surgeon, staff, and anesthesia. The patient was then taken into the operative suite, and transferred to the operative table. The patient was then sedated under the care of the anesthesia team. The patient was then positioned in the lateral decubitus position with the operative leg up. All bony prominences and neurovascular structures were well padded and protected. Operative site was then prepped and draped in the standard sterile fashion. Using a posterior Yates approach, I made an incision through the skin and subcutaneous tissue. I dissected down to the level of the abductor mechanism. The IT band and gluteus chen fascia was identified and then released in the center of the trochanter. I put Charnley retractor deep within the wound and identified our short external rotators. They were tagged and released. I identified the posterior hip capsule. It was  from the short external rotators. It was T'd, released off the neck of the femur and released up to the edge of the acetabulum. Hip was then carefully dislocated. The femoral neck osteotomy  proximal to the lesser trochanter based on our preoperative templates was then performed with an oscillating saw, to establish equal leg lengths.  At this time, the acetabulum was exposed with 3 retractors, 1 anterior, 1 posterolateral, and 1 superiorly to expose the acetabulum. The acetabulum was completely worn. I then carried out reaming of the shell, starting about 6 sizes below the templated femoral head size. Once reaming reached appropriate depth and coverage, an acetabular trial at the appropriate size was then placed. It was found that there was appropriate fit, coronal tilt, and anteversion. Copious irrigation was performed after removal of the trial and the final cup was then impacted into position. I had great fixation, had good bleeding bone underneath the shell. I then placed a trial liner within the hip, prepared the proximal femur, using a box chisel reamers and then followed by a broach. Broaching was carried up until appropriate fit was appreciated. The final femoral stem was placed after trialing of appropriate neck and head component trials. The hip was then trialed. The patient had good shuck, a few millimeters, had full extension of the hip, had no significant tightness in extension. The patient had no dislocation of the dislocation of the hip until about 85 to 90 degrees of internal rotation in an adducted position. We had great stability of the hip. The leg lengths were then accessed off of the nonoperated leg. As well as from our templated xray preoperative. .The trial implants were removed. Final implants were implanted and impacted into position. The hip was reduced to the acetabulum. I thoroughly irrigated the wound upon closure. Closed the capsular incision with #2 suture tape closed the short external rotators in the posterior femur with #2 suture tape. The deep soak was performed using our bedadine soak for 3 minutes. The wound was then irrigated. I placed our TXA mixture deep within the hip wound. I closed the subcutaneous layer with #2 suture tape, closed the subcutaneous layer with 2-0 Vicryl and skin staples. Sterile dressing was placed on the wound.  The patient recovered in the recovery room without difficulty. The patient tolerated the procedure well. Disposition: The patient was taken to PACU in stable condition. Once stable, he will be transferred to the floor. Orders have been provided to begin physical therapy, weight bear as tolerated Right lower extremity. Patient received a dose of antibiotics preoperatively. We will continue this for 24 hours postoperatively for infection prophylaxis. The patient will also be started on asa for DVT prophylaxis. We have consulted  and case management for discharge planning and consulted the PCP for medical management.        Electronically signed by Delfino Lerma DO on 2/2/2022 at 8:34 AM

## 2022-02-02 NOTE — PROGRESS NOTES
[address] [facility]  [facility] [place]. [place]        Date:2022                                                  Patient Name: Desire Chawla    MRN: [id number]    : 1945    Room: OR POOL/NONE      Evaluating OT: Kinsey Katz OTR/L; [id number]     Referring Provider and Specific Provider Orders/Date:      22  OT eval and treat  Start:  22,   End:  22,   ONE TIME,   Standing Count:  1 Occurrences,   R        Last continued at transfer on  12:24 PM    Bucky Rivas DO     Placement Recommendation: HHOT       Diagnosis:   1. Primary osteoarthritis of right hip    2.  Post-operative state         Surgery: R DENIZ       Pertinent Medical History:       Past Medical History:   Diagnosis Date    COVID-2021         Past Surgical History:   Procedure Laterality Date    COLONOSCOPY        Precautions:  Fall Risk, full weight bearing: R LE, hip precautions     Assessment of current deficits    [x] Functional mobility  [x]ADLs  [x] Strength               []Cognition    [x] Functional transfers   [x] IADLs         [] Safety Awareness   [x]Endurance    [] Fine Coordination              [x] Balance      [] Vision/perception   []Sensation     []Gross Motor Coordination  [x] ROM  [] Delirium                   [] Motor Control     OT PLAN OF CARE   OT POC based on physician orders, patient diagnosis and results of clinical assessment    Frequency/Duration 1-3 days/wk for 2 weeks PRN     Specific OT Treatment Interventions to include:   * Instruction/training on adapted ADL techniques and AE recommendations to increase functional independence within precautions       * Training on energy conservation strategies, correct breathing pattern and techniques to improve independence/tolerance for self-care routine  * Functional transfer/mobility training/DME recommendations for increased independence, safety, and fall prevention  * Patient/Family education to increase follow through with safety techniques and functional independence  * Recommendation of environmental modifications for increased safety with functional transfers/mobility and ADLs  * Splinting/positioning for increased function, prevention of contractures, and improve skin integrity  * Therapeutic exercise to improve motor endurance, ROM, and functional strength for ADLs/functional transfers  * Therapeutic activities to facilitate/challenge dynamic balance, stand tolerance for increased safety and independence with ADLs  * Positioning to improve skin integrity, interaction with environment and functional independence    Recommended Adaptive Equipment: hip kit provided, leg  provided       Home Living: alone; single family home, 1 story, 2 steps to enter with B rails, walk in shower. Equipment owned: wheeled walker, straight cane, toileting rails, grab bars, lift chair    Prior Level of Function: Independent with ADLs , Independent with IADLs; ambulated with no device     Driving: yes   Occupation: retired from his own business, heavy equipment      Pain Level: no pain at time of evaluation.       Cognition: A&O: 4/4; Follows 3 step directions   Memory: good    Sequencing: good    Problem solving: good    Judgement/safety: good     [facility]   AM-PAC Daily Activity Inpatient   How much help for putting on and taking off regular lower body clothing?: Total  How much help for Bathing?: A Lot  How much help for Toileting?: A Little  How much help for putting on and taking off regular upper body clothing?: A Little  How much help for taking care of personal grooming?: A Little  How much help for eating meals?: None  AM-[facility] Inpatient Daily Activity Raw Score: 16  AM-PAC Inpatient ADL T-Scale Score : 35.96  ADL Inpatient CMS 0-100% Score: 53.32  ADL Inpatient CMS G-Code Modifier : CK     Functional Assessment:    Initial Eval Status  Date: 2/2/22 Treatment Status  Date: STGs = LTGs  Time frame: 10-14 days   Feeding Independent   Independent    Grooming Supervision   Modified [place]    UB Dressing Supervision to [facility] gown and don shirt while seated EOB  Modified [place]    LB Dressing Dependent without the use of adaptive equipment. Instruction/training on use of AE (hip kit) to maintain hip precautions. Supervision at EOB with set up to doff socks with a reacher and don socks with a sock aide. Supervision with reacher to thread feet through underpants and pants while seated EOB then stood with minimal assist to bring up around hips and waist.   Supervision to on shoe while seated EOB by slipped feet into shoes. Modified [place]    Bathing Minimal Assist  Modified [place]    Toileting Supervision to stand to urinate   Independent    Bed Mobility  Supine to sit: Supervision   Sit to supine: Supervision   Supine to sit: Independent   Sit to supine: Independent    Functional Transfers Minimal Assist from EOB to wheeled walker with bed height elevated. Supervision for transfer to and from a wheelchair x 2 reps, minimal verbal cues to scoot up by leaning posteriorly and thrusting hips towards edge of chair to maintain hip precautions. Supervision for transfer from standing to edge of bed. CLINIC:  Supervision for transfer to and from bedside commode over toilet. Supervision for transfer to and from car adjusted height to crossover SUV height. Instruction/training in use of leg  for transfer from car (leg  provided). Transfer training with verbal cues for hand placement throughout session to improve safety. Independent    Functional Mobility Minimal assist with wheeled walker to improve balance in the room and during clinic, verbal cues for walker sequence and safety.    Modified [place]    Balance Sitting:     Static: good     Dynamic: fair   Standing: fair  with wheeled walker     Activity Tolerance fair   good    Visual/  Perceptual Glasses: yes                 Hand Dominance: right      AROM (PROM) Strength Additional Info:    RUE  WFL 5/5 good  and wfl FMC/dexterity noted during ADL tasks     LUE WFL 5/5 good  and wfl FMC/dexterity noted during ADL tasks       Hearing: WFL   Sensation:  No c/o numbness or tingling  Tone: WFL   Edema: yes, surgical extremity     Comments: Upon arrival the patient was supine. At end of session, patient was returned to supine with call light and phone within reach, all lines and tubes intact. Overall patient demonstrated decreased independence and safety during completion of ADL/functional transfer/mobility tasks. Pt would benefit from continued skilled OT to increase safety and independence with completion of ADL/IADL tasks for functional independence and quality of life. Treatment: OT treatment provided this date includes:    Instruction/training on safety and adapted techniques for completion of ADLs    Instruction/training on safe functional mobility/transfer techniques    Instruction/training on energy conservation/work simplification for completion of ADLs    Proper Positioning/Alignment   Instruction/training in weight bearing status, hip precautions, and walker sequence   Instruction/training in use of adaptive equipment for lower body dressing, demo provided, good return demonstration    Instruction/training in lower body dressing techniques     Rehab Potential: Good for established goals. Patient / Family Goal: return home today      Patient and/or family were instructed on functional diagnosis, prognosis/goals and OT plan of care. Demonstrated good understanding.      Eval Complexity: Low    Time In and Out: 1:15pm-2:08pm and 2:58pm-3:33pm     Total Treatment Time: 68       Min Units   OT Eval Low 97165  X  1    OT Eval Medium 09272      OT Eval High 19018      OT Re-Eval 98875            ADL/Self Care 06180  45  3 Therapeutic Activities 27554  28  2   Therapeutic Ex 16663       Orthotic Management 92943       Manual 73293     Neuro Re-Ed 73411       Non-Billable Time        Evaluation Time additionally includes thorough review of current medical information, gathering information on past medical history/social history and prior level of function, interpretation of standardized testing/informal observation of tasks, assessment of data and development of plan of care and goals.         Evaluating OT: Andria Romano OTR/L; [id number]

## 2022-02-02 NOTE — H&P
Updated H&P    Chief Complaint   Patient presents with    Hip Pain       Right hip follow up.          Subjective:     Patient ID: Dayday Humphries is a 68 y.o..  male     Knee Pain  Patient complains of right knee and right hip pain. This is evaluated as a personal injury. There was not a history of injury. The pain began 3 + months ago. The pain is located medial, lateral. He describes  His symptoms as aching. He has not experienced popping, clicking, locking, and giving way in the affected knee. The patient has had pain with kneeling, squating, and climbing stairs. Symptoms improve with rest. The symptoms are worse with activity. The knee has not given out or felt unstable. The patient can bend and straighten the knee fully. The patient is active in none. Treatment to date has been Tylenol, NSAID's and CSI to greater trochanter 'without significant relief. He is c/o righ thip pain lateral, no injury no treatment  Past Medical History   No past medical history on file. Past Surgical History   No past surgical history on file.        Current Medication      Current Outpatient Medications:     pravastatin (PRAVACHOL) 20 MG tablet, TK 1 T PO QD HS, Disp: , Rfl: 1    aspirin 81 MG tablet, Take 81 mg by mouth daily, Disp: , Rfl:      No Known Allergies  Social History               Socioeconomic History    Marital status: Unknown       Spouse name: Not on file    Number of children: Not on file    Years of education: Not on file    Highest education level: Not on file   Occupational History    Not on file   Tobacco Use    Smoking status: Not on file    Smokeless tobacco: Not on file   Substance and Sexual Activity    Alcohol use: Not on file    Drug use: Not on file    Sexual activity: Not on file   Other Topics Concern    Not on file   Social History Narrative    Not on file      Social Determinants of Health          Financial Resource Strain:     Difficulty of Paying Living Expenses: Not on file   Food Insecurity:     Worried About [facility] in the Last Year: Not on file    Edu of Food in the Last Year: Not on file   Transportation Needs:     Lack of Transportation (Medical): Not on file    Lack of Transportation (Non-Medical): Not on file   Physical Activity:     Days of Exercise per Week: Not on file    Minutes of Exercise per Session: Not on file   Stress:     Feeling of Stress : Not on file   Social Connections:     Frequency of Communication with Friends and Family: Not on file    Frequency of Social Gatherings with Friends and Family: Not on file    Attends Jain Services: Not on file    Active Member of [address] [place] [address] or Organizations: Not on file    Attends Club or Organization Meetings: Not on file    Marital Status: Not on file   Intimate Partner Violence:     Fear of Current or Ex-Partner: Not on file    Emotionally Abused: Not on file    Physically Abused: Not on file    Sexually Abused: Not on file   Housing Stability:     Unable to Pay for Housing in the Last Year: Not on file    Number of [place] in the Last Year: Not on file    Unstable Housing in the Last Year: Not on file         Family History   No family history on file.           REVIEW OF SYSTEMS:      General/Constitution:  (-)weight loss, (-)fever, (-)chills, (-)weakness. Skin: (-) rash,(-) psoriasis,(-) eczema, (-)skin cancer. Musculoskeletal: (-) fractures,  (-) dislocations,(-) collagen vascular disease, (-) fibromyalgia, (-) multiple sclerosis, (-) muscular dystrophy, (-) RSD,(-) joint pain (-)swelling, (-) joint pain,swelling. Neurologic: (-) epilepsy, (-)seizures,(-) brain tumor,(-) TIA, (-)stroke, (-)headaches, (-)Parkinson disease,(-) memory loss, (-) LOC. Cardiovascular: (-) Chest pain, (-) swelling in legs/feet, (-) SOB, (-) cramping in legs/feet with walking. Respiratory: (-) SOB, (-) Coughing, (-) night sweats.   GI: (-) nausea, (-) vomiting, (-) diarrhea, (-) blood in stool, (-) gastric ulcer. Psychiatric: (-) Depression, (-) Anxiety, (-) bipolar disease, (-) Alzheimer's Disease  Allergic/Immunologic: (-) allergies latex, (-) allergies metal, (-) skin sensitivity. Hematlogic: (-) anemia, (-) blood transfusion, (-) DVT/PE, (-) Clotting disorders     Subjective:      Vital signs are stable.  In general, patient is awake, alert and oriented X3, in no apparent distress.  Examination of HENT reveals normocephalic, atraumatic.  PERRLA/EOMI sclera are white.  Conjunctivae are clear.  TM's are intact.  Pharynx is pink and moist.  Uvula and tongue are midline.  Heart: Positive S1 and positive S2 with regular rate and rhythm.  Lungs: Clear to auscultation bilaterally without rales, rhonchi or wheezes.  Abdomen: soft, nontender.  Positive bowel sounds.  No organomegaly.  No guarding or rigidity.        Constitution:  /60   Pulse 70   Temp 98.1 °F (36.7 °C) (Infrared)   Resp 16   SpO2 98%        Psycihatric:  The patient is alert and oriented x 3, appears to be stated age and in no distress.       Respiratory:  Respiratory effort is not labored. Patient is not gasping. Palpation of the chest reveals no tactile fremitus.     Skin:  Upon inspection: the skin appears warm, dry and intact. There is  a previous scar over the affected area. There is any cellulitis, lymphedema or cutaneous lesions noted in the lower extremities. Upon palpation there is no induration noted.       Neurologic:  Gait: normal;  Motor exam of the lower extremities show ; quadriceps, hamstrings, foot dorsi and plantar flexors intact R.  5/5 and L. 5/5. Deep tendon reflexes are 2/4 at the knees and 2/4 at the ankles with strong extensor hallicus longus motor strength bilaterally. Sensory to both feet is intact to all sensory roots.     Cardiovascular: The vascular exam is normal and is well perfused to distal extremities. Distal pulses DP/PT: R. 2+; L. 2+.   There is cap refill noted less than two seconds in all digits. There is not edema of the bilateral lower extremities. There is not varicosities noted in the distal extremities.       Lymph:  Upon palpation,  there is no lymphadenopathy noted in bilateral lower extremities.       Musculoskeletal:  Gait: normal; examination of the nails and digits reveal no cyanosis or clubbing.     Lumbar exam:  On visual inspection, there is not deformity of the spine. full range of motion, no tenderness, palpable spasm or pain on motion. Special tests: Straight Leg Raise negative, Skylar test negative.        Hip exam:   Upon inspection, there is not deformity noted. Upon palpation there is tenderness to trochanter and groin. ROM: is   Left 10/20, right 5/10. Strength: Hip Flexors 5/5; Hip Abductors 5/5; Hip Adduction 5/5.      Knee exam:  Right knee exam shows;  range of motion of R. Knee is 0 to 120, and L. Knee is 0 to 120. The patient does not have  pain on motion, effusion is none, there is tenderness over the  lateral, anterior region, there are not any masses, there is not ligamentous instability, there is not  deformity noted. Knee exam: neither positive for moderate crepitations, some mild tenderness laxity is not noted with  stress. There is not a popliteal cyst.     R. Knee:  Lachman's negative, Anterior Drawer negative, Posterior Drawer negative  Shira's negative, Thallasy  negative,   PF grind test negative, Apprehension test negative, Patellar J sign  negative  L. Knee:  Lachman's negative, Anterior Drawer negative, Posterior Drawer negative  Shira's negative, Thallasy  negative,   PF grind test negative, Apprehension test negative,  Patellar J sign  negative     Xray Exam:  3 views right knee. Intact alignment. No acute osseous   abnormality or joint effusion. Mild narrowing of the lateral   compartment with small marginal spurs.  Similar more severe changes on   the left.         Radiographic findings reviewed with patient     Assessment: Encounter Diagnoses   Name Primary?  Primary osteoarthritis of right hip Yes         Plan:  Natural history and expected course discussed. Questions answered. Educational materials distributed. Rest, ice, compression, and elevation (RICE) therapy. Reduction in offending activity. I had a lengthy discussion with the patient regarding their diagnosis. I explained treatment options including surgical vs non surgical treatment. I reviewed in detail the risks and benefits and outlined the procedure in detail with expected outcomes and possible complications. I also discussed non surgical treatment such as injections (CSI and visco supplementation), physical therapy, topical creams and NSAID's. They have elected for surgical management at this time. We discussed various treatment options with the patient including physical therapy and cortisone injections. The patient is unable to ambulate more than 100 feet and is unable to perform the average daily activities including:  Light housework, activities of daily living, donning clothes, toileting and exercise. Patient has failed previous conservative measures including cortisone injections, NSAIDs, PT, HEP and pain medication and is currently a fall risk due to disability and decreased functioning. The patient wishes to have the total hip arthroplasty. The risks and benefits of a total hip replacement were discussed with the patient. The risks include but are not limited to: infection, injury to blood vessels and nerves, non relief of symptoms, arthrofibrosis of hip, aseptic loosening of prosthesis, intraoperative fracture, blood loss, PE/DVT, MI, dislocation of hip, need for further operative intervention and death. The patient is aware of the risks and wished to proceed with a Right total hip replacement 2/2/2022.       The patient was counseled at length about the risks of dianne Covid-19 during their perioperative period and any recovery window from their procedure.  The patient was made aware that dianne Covid-19  may worsen their prognosis for recovering from their procedure  and lend to a higher morbidity and/or mortality risk.  All material risks, benefits, and reasonable alternatives including postponing the procedure were discussed.  The patient does wish to proceed with the procedure at this time.

## 2022-02-15 DIAGNOSIS — Z96.641 STATUS POST RIGHT HIP REPLACEMENT: Primary | ICD-10-CM

## 2022-02-17 ENCOUNTER — OFFICE VISIT (OUTPATIENT)
Dept: ORTHOPEDIC SURGERY | Age: 77
End: 2022-02-17

## 2022-02-17 VITALS — TEMPERATURE: 98 F | BODY MASS INDEX: 31.21 KG/M2 | WEIGHT: 218 LBS | HEIGHT: 70 IN

## 2022-02-17 DIAGNOSIS — Z96.641 STATUS POST RIGHT HIP REPLACEMENT: Primary | ICD-10-CM

## 2022-02-17 NOTE — PROGRESS NOTES
Subjective:     Johnathon Caller is now 2 weeks post right total hip arthroplasty, DOS: 2/2/22. Pain is controlled without any medications. The patient denies  fever, wound drainage, increasing redness, pus, increasing pain, increasing swelling. Post op problems reported:  none. He is ambulating with a cane. Objective:      General :    alert, appears stated age and cooperative   Gait:  Antalgic. Sutures:   staples removed today   Incision:  healing well, no significant drainage, no dehiscence, no significant erythema   Tenderness:  none   Flexion ROM:  full range of motion   Extension ROM:  full range of motion   Abduction ROM:  full range of motion   DVT Evaluation:  No evidence of DVT seen on physical exam.     Imaging:  Right with no signs of aseptic loosening  Radiographic findings reviewed with patient     Assessment:     Encounter Diagnosis   Name Primary?  Status post right hip replacement Yes       Plan:     Continues current post-op course, staples were removed at today's appt  Patient is to start taking enteric coated aspirin 81 mg, 1 tablet twice daily. Patient is to continue wearing CATNIA hose, on during the day and off at night. Outpatient physical therapy is to begin immediately. No bathing/swimming/hot tub for two weeks or until incision is completely closed. We will see the patient back in 4 weeks for repeat xray and evaluation. Please call office with any questions or concerns at [phone].

## 2022-02-21 ENCOUNTER — EVALUATION (OUTPATIENT)
Dept: PHYSICAL THERAPY | Age: 77
End: 2022-02-21
Payer: MEDICARE

## 2022-02-21 DIAGNOSIS — Z96.641 STATUS POST RIGHT HIP REPLACEMENT: Primary | ICD-10-CM

## 2022-02-21 PROCEDURE — 97162 PT EVAL MOD COMPLEX 30 MIN: CPT | Performed by: PHYSICAL THERAPIST

## 2022-02-21 PROCEDURE — 97110 THERAPEUTIC EXERCISES: CPT | Performed by: PHYSICAL THERAPIST

## 2022-02-21 NOTE — PROGRESS NOTES
[facility] OUTPATIENT REHABILITATION  PHYSICAL THERAPY INITIAL EVALUATION         Date:  2022   Patient: Dionisio Curran  : 1945  MRN: [id number]  Referring Provider: DO Wyatt Bueno [facility] Diagnosis:      Diagnosis Orders   1. Status post right hip replacement         Physician Order: Eval and Treat     SUBJECTIVE:     Surgical procedure: R DENIZ    Date of surgery: 2022    Services provided following surgery: home care    History: Has OA contralateral hip and knee, but they do not bother him. Chief complaint: edema, weakness, inability / limited ability to use leg, difficulty walking, difficulty with stairs, limited ability to complete home/outdoor chores/tasks    Pain:   Current: 0/10       Imaging results: XR CHEST (2 VW)    Result Date: 2022  EXAMINATION: TWO XRAY VIEWS OF THE CHEST 2022 12:12 pm COMPARISON: None. HISTORY: ORDERING SYSTEM PROVIDED HISTORY: Primary osteoarthritis of right hip TECHNOLOGIST PROVIDED HISTORY: Pre Op Reason for exam:->Pre OP FINDINGS: The lungs are without acute focal process. There is no effusion or pneumothorax. The cardiomediastinal silhouette is without acute process. The osseous structures are without acute process. No acute process. XR HIP RIGHT (2-3 VIEWS)    Result Date: 2022  EXAMINATION: TWO XRAY VIEWS OF THE RIGHT HIP 2022 8:31 am COMPARISON: None. HISTORY: ORDERING SYSTEM PROVIDED HISTORY: Status post right hip replacement FINDINGS: Three views of the pelvis and right hip were obtained. Note is made of a right hip prosthesis. There is anatomic alignment of prosthesis. There is no fracture or hardware complication. Note is made of degenerative changes of the left hip. 1. Status post total right hip arthroplasty. There is no fracture or malalignment of the right hip.      XR HIP RIGHT (2-3 VIEWS)    Result Date: 2022  EXAMINATION: TWO XRAY VIEWS OF THE RIGHT HIP 2/2/2022 7:57 am COMPARISON: 15 November 2021 HISTORY: ORDERING SYSTEM PROVIDED HISTORY: Post-operative state TECHNOLOGIST PROVIDED HISTORY: Of operative side while in recovery room Reason for exam:->Post Op FINDINGS: Anatomically aligned right DENIZ with no abnormal bone or soft tissue findings. Mild-to-moderate osteoarthritis at the contralateral left hip. 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 Anatomically aligned right DENIZ with no unexpected findings. Additional observations as outlined above. Past Medical History:  Past Medical History:   Diagnosis Date    COVID-19 12/2021     Past Surgical History:   Procedure Laterality Date    COLONOSCOPY      TOTAL HIP ARTHROPLASTY Right 2/2/2022    RIGHT HIP TOTAL ARTHROPLASTY (DEPUY) performed by Chloe Osman DO at [address]       Medications:   Current Outpatient Medications   Medication Sig Dispense Refill    aspirin 325 MG EC tablet Take 1 tablet by mouth 2 times daily for 28 days 56 tablet 0    gabapentin (NEURONTIN) 100 MG capsule Take 1 capsule by mouth 3 times daily for 30 days. Intended supply: 30 days 90 capsule 0    celecoxib (CELEBREX) 100 MG capsule Take 1 capsule by mouth 2 times daily 60 capsule 0    pravastatin (PRAVACHOL) 20 MG tablet TK 1 T PO QD HS  1     No current facility-administered medications for this visit. Exercise regimen: recumbent bike and weight machines 3 days per week at [employer]: yard work, working around the house    Patient Goals: return to prior activity, full use of leg, return to hobbies, return to exercise regimen / fitness program    Precautions/Contraindications: posterior / lateral DENIZ precautions     OBJECTIVE:     Estimated body mass index is 31.28 kg/m² as calculated from the following:    Height as of 2/17/22: 5' 10\" (1.778 m). Weight as of 2/17/22: 218 lb (98.9 kg). Observations: Well nourished male with normal affect.  Rises well from program (HEP)    Rehab Potential: [x] Good  [] Fair  [] Poor    PLAN       Treatment Plan:  instruction in home exercise program   therapeutic exercise   therapeutic activity   neuromuscular re-education   balance training   proprioceptive training     The following CPT codes are likely to be used in the care of this patient:   66875 PT Evaluation: Moderate Complexity   27325 PT Re-Evaluation   15137 Therapeutic Exercise   51737 Neuromuscular Re-Education   [facility] Therapeutic Activities     Suggested Professional Referral: [x] No  [] Yes:     Patient Education:  [x] Plans / Goals, Risks / Benefits discussed  [x] Home exercise program  Method of Education: [x] Verbal  [x] Demo  [x] Written  Comprehension of Education:  [x] Verbalizes understanding. [x] Demonstrates understanding. [] Needs Review. [] Demonstrates / verbalizes understanding of HEP / Herson Baca previously given. Frequency:  1-2 days per week for 6 weeks    Patient understands diagnosis/prognosis and consents to treatment, plan and goals: [x] Yes    [] No     Thank you for the opportunity to work with your patient. If you have questions or comments, please contact me at [phone]; fax: [phone]. Electronically signed by: Selena Montes, PT    Medicare Patients Only     Please sign Physician's Certification and return to: [address] [place] [address] PHYSICAL THERAPY  [address] [address]  Dept: [phone]  Dept Fax: [phone]  Loc: [phone] Certification / Comments     Frequency/Duration 1-2 days per week for 6 weeks. Certification period from 2/21/2022  to 05/21/2022. I have reviewed the Plan of Care established for skilled therapy services and certify that the services are required and that they will be provided while the patient is under my care.     Physician's Comments/Revisions:               Physician's Printed Name: Physician's Signature:                                                               Date:

## 2022-02-21 NOTE — PROGRESS NOTES
Physical Therapy Daily Treatment Note    Date: 2022  Patient Name: Aravind De La Vega  : 1945   MRN: [id number]  DOInjury: --   DOSx: 2022  Referring Provider: Delfino Lerma DO  [address]  [facility] Diagnosis:      Diagnosis Orders   1. Status post right hip replacement         Access Code: [email or web address]  URL: [email or web address]  Date: 2022  Prepared by: Allison Carlisle    Program Notes  Proper Elevation    -- Elevate limb on a stable stack of blankets / pillows so leg is higher than heart. -- Do this for 45 minutes, 2-3 times a day  -- You may apply ice for the first 15 minutes, then remove it while you continue your elevation  -- Be sure to lie flat in bed or on a couch with a comfortable pillow under your head. Lying back in a recliner is not helpful.  -- You may prop your head up if you cannot tolerate lying completely flat, but only as high as necessary for comfort. Exercises  Clamshell - 1 x daily - 3 sets - 10 reps  Supine Bridge - 1 x daily - 3 sets - 10 reps    X = TO BE PERFORMED NEXT VISIT  > = PROGRESS TO THIS    S: See dino [place]:    Time [id number]       Visit -12  1-2/wk x 6 wks Repeat outcome measure at mid point and end. Pain Pain 0/10       ROM 90° Flexion,  10° Extension, 30° Abduction,  30° ER, 10° IR       Modalities         Ice     MO   Stretch             TE   Exercise         Glute sets   TE   Quad sets    TE   Clamshell 3 x 10   TE   S-lying hip ABD X   TE   Bridging 2-leg 3 x 10  TE         Marching in place X  TE   Alternating side kicks  X   TE    Step up -- FWD  X    TE   Step up -- LAT >    TE   Leg Press     TE            Hallway marching for balance and proprioception   >    NR   Hallway side stepping for balance and proprioception   >    NR                                                 A:  Tolerated well. Discussed anatomy, physiology, body mechanics, principles of loading, and progressive loading/activity. Reviewed home exercise program extensively along with instructions for ice and elevation; written copy provided.    P: Continue with rehab plan  Fabi Pearce PT    Treatment Charges: Mins Units   Initial Evaluation 30 1   Re-Evaluation     Ther Exercise         TE 10 1   Manual Therapy     MT     Ther Activities        TA     Gait Training          GT     Neuro Re-education NR     Modalities     Non-Billable Service Time     Other     Total Time/Units 40 2

## 2022-02-23 ENCOUNTER — TREATMENT (OUTPATIENT)
Dept: PHYSICAL THERAPY | Age: 77
End: 2022-02-23
Payer: MEDICARE

## 2022-02-23 DIAGNOSIS — Z96.641 STATUS POST RIGHT HIP REPLACEMENT: Primary | ICD-10-CM

## 2022-02-23 PROCEDURE — 97110 THERAPEUTIC EXERCISES: CPT

## 2022-02-23 NOTE — PROGRESS NOTES
Physical Therapy Daily Treatment Note    Date: 2022  Patient Name: Carmencita Wall  : 1945   MRN: [id number]  DOInjury: --   DOSx: 2022    Referring Provider:   Aicha Hilton DO  [address] [facility]:      Diagnosis Orders   1. Status post right hip replacement         Access Code: [email or web address]  URL: [email or web address]  Date: 2022  Prepared by: Enrico Sebastian    Program Notes  Proper Elevation    -- Elevate limb on a stable stack of blankets / pillows so leg is higher than heart. -- Do this for 45 minutes, 2-3 times a day  -- You may apply ice for the first 15 minutes, then remove it while you continue your elevation  -- Be sure to lie flat in bed or on a couch with a comfortable pillow under your head. Lying back in a recliner is not helpful.  -- You may prop your head up if you cannot tolerate lying completely flat, but only as high as necessary for comfort. Exercises  Clamshell - 1 x daily - 3 sets - 10 reps  Supine Bridge - 1 x daily - 3 sets - 10 reps    X = TO BE PERFORMED NEXT VISIT  > = PROGRESS TO THIS    Precautions/Contraindications: posterior / lateral DENIZ precautions     S: Patient feeling good, some soreness. Patient went to gym this morning to work on upper body, felt good. O: Patient arrived 10 minutes late  Time 4255-0967       Visit -12  1-2/wk x 6 wks Repeat outcome measure at mid point and end.      Pain Pain 0/10       ROM 90° Flexion,  10° Extension, 30° Abduction,  30° ER, 10° IR       Modalities         Ice     MO   Stretch             TE   Exercise         Glute sets   TE   Quad sets    TE   Clamshell 3 x 10   TE   S-lying hip ABD 3 x 10   TE   Bridging 2-leg 3 x 10  TE         Marching in place 2 x 20 reps  TE   Alternating side kicks 2 x 20 reps   TE    Step up -- FWD 7\" 20 reps    TE   Step up -- LAT >    TE   Leg Press     TE            Hallway marching for balance and proprioception   >    NR Hallway side stepping for balance and proprioception   >    NR                                                 A: Tolerated well with no changes after session. Discussed anatomy, physiology, body mechanics, principles of loading, and progressive loading/activity. Reviewed home exercise program extensively along with instructions for ice and elevation; written updated copy provided.    P: Continue with rehab plan  Anthony Evans PTA    Treatment Charges: Mins Units   Initial Evaluation     Re-Evaluation     Ther Exercise         TE 30 2   Manual Therapy     MT     Ther Activities        TA     Gait Training          GT     Neuro Re-education NR     Modalities     Non-Billable Service Time     Other     Total Time/Units 30 2

## 2022-03-01 ENCOUNTER — TREATMENT (OUTPATIENT)
Dept: PHYSICAL THERAPY | Age: 77
End: 2022-03-01
Payer: MEDICARE

## 2022-03-01 DIAGNOSIS — Z96.641 STATUS POST RIGHT HIP REPLACEMENT: Primary | ICD-10-CM

## 2022-03-01 PROCEDURE — 97112 NEUROMUSCULAR REEDUCATION: CPT

## 2022-03-01 PROCEDURE — 97110 THERAPEUTIC EXERCISES: CPT

## 2022-03-01 NOTE — PROGRESS NOTES
Physical Therapy Daily Treatment Note    Date: 3/1/2022  Patient Name: Johnathon Caller  : 1945   MRN: [id number]  DOInjury: --   DOSx: 2022    Referring Provider:   Santiago Ward DO  [address] [facility] Diagnosis:    Diagnosis Orders   1. Status post right hip replacement       Access Code: [email or web address]  URL: [email or web address]  Date: 2022  Prepared by: Florencio Oar    Program Notes  Proper Elevation    -- Elevate limb on a stable stack of blankets / pillows so leg is higher than heart. -- Do this for 45 minutes, 2-3 times a day  -- You may apply ice for the first 15 minutes, then remove it while you continue your elevation  -- Be sure to lie flat in bed or on a couch with a comfortable pillow under your head. Lying back in a recliner is not helpful.  -- You may prop your head up if you cannot tolerate lying completely flat, but only as high as necessary for comfort. Exercises  Clamshell - 1 x daily - 3 sets - 10 reps  Supine Bridge - 1 x daily - 3 sets - 10 reps    X = TO BE PERFORMED NEXT VISIT  > = PROGRESS TO THIS    Precautions/Contraindications: posterior / lateral DENIZ precautions     S: Patient feeling good, some soreness. Patient went to gym this morning to work on upper body, felt good. O:  Time 4916-2317       Visit 3/6-12  1-2/wk x 6 wks Repeat outcome measure at mid point and end.      Pain Pain 0/10       ROM 90° Flexion,  10° Extension, 30° Abduction,  30° ER, 10° IR       Modalities         Ice     MO   Stretch             TE   Exercise         Bike  L8 10 min  TE   Glute sets   TE   Quad sets    TE   Clamshell 3 x 10   TE   S-lying hip ABD 3 x 10   TE   Bridging 2-leg 3 x 10  TE         Marching in place  TE   Alternating side kicks   TE    Step up -- FWD 7\" 20 reps    TE   Step up -- LAT 7\" 20 reps    TE   Leg Press 60# 3 x 15    TE            Hallway marching for balance and proprioception  45 ft x 2    NR   Hallway side

## 2022-03-03 ENCOUNTER — TREATMENT (OUTPATIENT)
Dept: PHYSICAL THERAPY | Age: 77
End: 2022-03-03
Payer: MEDICARE

## 2022-03-03 DIAGNOSIS — Z96.641 STATUS POST RIGHT HIP REPLACEMENT: Primary | ICD-10-CM

## 2022-03-03 PROCEDURE — 97110 THERAPEUTIC EXERCISES: CPT

## 2022-03-03 PROCEDURE — 97112 NEUROMUSCULAR REEDUCATION: CPT

## 2022-03-03 NOTE — PROGRESS NOTES
Physical Therapy Daily Treatment Note    Date: 3/3/2022  Patient Name: Chacho Contreras  : 1945   MRN: [id number]  DOInjury: --   DOSx: 2022    Referring Provider:   Gilberto Faust DO  [address]  [facility] Diagnosis:    Diagnosis Orders   1. Status post right hip replacement       Access Code: [email or web address]  URL: [email or web address]  Date: 2022  Prepared by: Corinne Splinter    Program Notes  Proper Elevation    -- Elevate limb on a stable stack of blankets / pillows so leg is higher than heart. -- Do this for 45 minutes, 2-3 times a day  -- You may apply ice for the first 15 minutes, then remove it while you continue your elevation  -- Be sure to lie flat in bed or on a couch with a comfortable pillow under your head. Lying back in a recliner is not helpful.  -- You may prop your head up if you cannot tolerate lying completely flat, but only as high as necessary for comfort. Exercises  Clamshell - 1 x daily - 3 sets - 10 reps  Supine Bridge - 1 x daily - 3 sets - 10 reps    X = TO BE PERFORMED NEXT VISIT  > = PROGRESS TO THIS    Precautions/Contraindications: posterior / lateral DENIZ precautions     S: Patient feeling good, some soreness. Patient went to gym this morning to work on upper body, felt good. O:  Time 3047-5693       Visit -12  1-2/wk x 6 wks Repeat outcome measure at mid point and end.      Pain Pain 0/10       ROM 90° Flexion,  10° Extension, 30° Abduction,  30° ER, 10° IR       Modalities         Ice     MO   Stretch             TE   Exercise         Bike  L8 10 min  TE   Glute sets   TE   Quad sets    TE   Clamshell  HEP TE   S-lying hip ABD  \" TE   Bridging 2-leg  \" TE               Marching in place 3# 2 x 20 reps  TE   Alternating side kicks 3# 2 x 20 reps   TE    Squats 3# 2 x 10 reps     Sit to stands  Next           Step up -- FWD 7\" 20 reps    TE   Step up -- LAT 7\" 20 reps    TE   Leg Press 60# 3 x 15    TE Hallway marching for balance and proprioception  45 ft x 2    NR   Hallway side stepping for balance and proprioception   45 ft x 2    NR                                                 A: Tolerated well with no changes after session. Discussed anatomy, physiology, body mechanics, principles of loading, and progressive loading/activity. Reviewed home exercise program extensively along with instructions for ice and elevation; written updated copy provided.    P: Continue with rehab plan  Victorino Ramp, PTA    Treatment Charges: Mins Units   Initial Evaluation     Re-Evaluation     Ther Exercise         TE 30 2   Manual Therapy     MT     Ther Activities        TA     Gait Training          GT     Neuro Re-education NR 10 1   Modalities     Non-Billable Service Time     Other     Total Time/Units 40 3

## 2022-03-07 ENCOUNTER — TREATMENT (OUTPATIENT)
Dept: PHYSICAL THERAPY | Age: 77
End: 2022-03-07
Payer: MEDICARE

## 2022-03-07 DIAGNOSIS — Z96.641 STATUS POST RIGHT HIP REPLACEMENT: Primary | ICD-10-CM

## 2022-03-07 PROCEDURE — 97112 NEUROMUSCULAR REEDUCATION: CPT

## 2022-03-07 PROCEDURE — 97110 THERAPEUTIC EXERCISES: CPT

## 2022-03-07 NOTE — PROGRESS NOTES
Physical Therapy Daily Treatment Note    Date: 3/7/2022  Patient Name: Sarika Pelaez  : 1945   MRN: [id number]  DOInjury: --   DOSx: 2022    Referring Provider:   Bee Peter DO  [address] [facility]             Medical Diagnosis:    Diagnosis Orders   1. Status post right hip replacement       Access Code: [email or web address]  URL: [email or web address]  Date: 2022  Prepared by: Hugh Silverman    Program Notes  Proper Elevation    -- Elevate limb on a stable stack of blankets / pillows so leg is higher than heart. -- Do this for 45 minutes, 2-3 times a day  -- You may apply ice for the first 15 minutes, then remove it while you continue your elevation  -- Be sure to lie flat in bed or on a couch with a comfortable pillow under your head. Lying back in a recliner is not helpful.  -- You may prop your head up if you cannot tolerate lying completely flat, but only as high as necessary for comfort. Exercises  Clamshell - 1 x daily - 3 sets - 10 reps  Supine Bridge - 1 x daily - 3 sets - 10 reps    X = TO BE PERFORMED NEXT VISIT  > = PROGRESS TO THIS    Precautions/Contraindications: posterior / lateral DENIZ precautions     S: Patient went to gym Saturday and had increased soreness from riding bike there for longer duration and increased resistance. O:  Time 5290-9962       Visit -12  1-2/wk x 6 wks Repeat outcome measure at mid point and end.      Pain Pain 0/10       ROM 90° Flexion,  10° Extension, 30° Abduction,  30° ER, 10° IR       Modalities         Ice     MO   Stretch             TE   Exercise         Bike  L8 10 min  TE   Glute sets   TE   Quad sets    TE   Clamshell  HEP TE   S-lying hip ABD  \" TE   Bridging 2-leg  \" TE               Marching in place 3# 2 x 20 reps  TE   Alternating side kicks 3# 2 x 20 reps   TE    Squats 3# 3 x 10 reps     Sit to stands  1 x 10 reps  1 x 5 reps  NR         Step up -- FWD 3# 7\" 25 reps    TE   Step up -- LAT 3# 7\" 25 reps TE   Leg Press 60# 3 x 15    TE            Hallway marching for balance and proprioception  3# 45 ft x 2    NR   Hallway side stepping for balance and proprioception   3# 45 ft x 2    NR                                                 A: Tolerated well with some fatigue after sit to stands. Discussed anatomy, physiology, body mechanics, principles of loading, and progressive loading/activity.   P: Continue with rehab plan  Alejandra Brown PTA    Treatment Charges: Mins Units   Initial Evaluation     Re-Evaluation     Ther Exercise         TE 30 2   Manual Therapy     MT     Ther Activities        TA     Gait Training          GT     Neuro Re-education NR 10 1   Modalities     Non-Billable Service Time     Other     Total Time/Units 40 3

## 2022-03-10 ENCOUNTER — TREATMENT (OUTPATIENT)
Dept: PHYSICAL THERAPY | Age: 77
End: 2022-03-10
Payer: MEDICARE

## 2022-03-10 DIAGNOSIS — Z96.641 STATUS POST RIGHT HIP REPLACEMENT: Primary | ICD-10-CM

## 2022-03-10 PROCEDURE — 97110 THERAPEUTIC EXERCISES: CPT

## 2022-03-10 PROCEDURE — 97112 NEUROMUSCULAR REEDUCATION: CPT

## 2022-03-10 NOTE — PROGRESS NOTES
Physical Therapy Daily Treatment Note    Date: 3/10/2022  Patient Name: Esperanza Anderson  : 1945   MRN: [id number]  DOInjury: --   DOSx: 2022    Referring Provider:   Jyoti Burgos DO  [address] [facility]:    Diagnosis Orders   1. Status post right hip replacement       Access Code: [email or web address]  URL: [email or web address]  Date: 2022  Prepared by: Francine Kohli    Program Notes  Proper Elevation    -- Elevate limb on a stable stack of blankets / pillows so leg is higher than heart. -- Do this for 45 minutes, 2-3 times a day  -- You may apply ice for the first 15 minutes, then remove it while you continue your elevation  -- Be sure to lie flat in bed or on a couch with a comfortable pillow under your head. Lying back in a recliner is not helpful.  -- You may prop your head up if you cannot tolerate lying completely flat, but only as high as necessary for comfort. Exercises  Clamshell - 1 x daily - 3 sets - 10 reps  Supine Bridge - 1 x daily - 3 sets - 10 reps    X = TO BE PERFORMED NEXT VISIT  > = PROGRESS TO THIS    Precautions/Contraindications: posterior / lateral DENIZ precautions     S: Patient noticed increased soreness in hip when he walks. O:  Time 9906-3982       Visit -  1-2/wk x 6 wks Repeat outcome measure at mid point and end.      Pain Pain 0/10       ROM 90° Flexion,  10° Extension, 30° Abduction,  30° ER, 10° IR       Modalities         Ice     MO   Stretch             TE   Exercise         Bike  L8 10 min  TE   Glute sets   TE   Quad sets    TE   Clamshell  HEP TE   S-lying hip ABD  \" TE   Bridging 2-leg  \" TE               Marching in place 3# 2 x 20 reps  TE   Alternating side kicks 3# 2 x 20 reps   TE    Squats 3# 3 x 10 reps     Sit to stands  2 x 10 reps  NR         Step up -- FWD 3# 7\" 25 reps    TE   Step up -- LAT 3# 7\" 25 reps    TE   Leg Press 60# 3 x 15    TE            Hallway marching for balance and proprioception  3# 45 ft x 2    NR   Hallway side stepping for balance and proprioception   3# 45 ft x 2    NR                                                 A: Tolerated well with improvements in sit to stands. Discussed anatomy, physiology, body mechanics, principles of loading, and progressive loading/activity.   P: Continue with rehab plan  Pérez Richardson PTA    Treatment Charges: Mins Units   Initial Evaluation     Re-Evaluation     Ther Exercise         TE 30 2   Manual Therapy     MT     Ther Activities        TA     Gait Training          GT     Neuro Re-education NR 10 1   Modalities     Non-Billable Service Time     Other     Total Time/Units 40 3

## 2022-03-15 ENCOUNTER — TREATMENT (OUTPATIENT)
Dept: PHYSICAL THERAPY | Age: 77
End: 2022-03-15
Payer: MEDICARE

## 2022-03-15 DIAGNOSIS — Z96.641 STATUS POST RIGHT HIP REPLACEMENT: Primary | ICD-10-CM

## 2022-03-15 PROCEDURE — 97110 THERAPEUTIC EXERCISES: CPT

## 2022-03-15 PROCEDURE — 97112 NEUROMUSCULAR REEDUCATION: CPT

## 2022-03-15 NOTE — PROGRESS NOTES
Physical Therapy Daily Treatment Note    Date: 3/15/2022  Patient Name: Tate Pino  : 1945   MRN: [id number]  DOInjury: --   DOSx: 2022    Referring Provider:   Analy Ramirez DO  [address] [facility] Diagnosis:    Diagnosis Orders   1. Status post right hip replacement       Access Code: [email or web address]  URL: [email or web address]  Date: 2022  Prepared by: Hai Landing    Program Notes  Proper Elevation    -- Elevate limb on a stable stack of blankets / pillows so leg is higher than heart. -- Do this for 45 minutes, 2-3 times a day  -- You may apply ice for the first 15 minutes, then remove it while you continue your elevation  -- Be sure to lie flat in bed or on a couch with a comfortable pillow under your head. Lying back in a recliner is not helpful.  -- You may prop your head up if you cannot tolerate lying completely flat, but only as high as necessary for comfort. Exercises  Clamshell - 1 x daily - 3 sets - 10 reps  Supine Bridge - 1 x daily - 3 sets - 10 reps    X = TO BE PERFORMED NEXT VISIT  > = PROGRESS TO THIS    Precautions/Contraindications: posterior / lateral DENIZ precautions     S: Patient continues to have ache when he walks. Patient feels 70% better since start of therapy. Patient wants to get back to walking 3-4 miles or 10,000 steps a day without getting tired; right now getting 4,000. O:  Time [id number]       Visit -  1-2/wk x 6 wks Repeat outcome measure at mid point and end.      Pain Pain 0/10       ROM 90° Flexion,  10° Extension, 30° Abduction,  30° ER, 10° IR       Modalities         Ice     MO   Stretch             TE   Exercise         Bike  L8 10 min  TE   Glute sets   TE   Quad sets    TE   Clamshell HEP TE   S-lying hip ABD  \" TE   Bridging 2-leg  \" TE               Marching in place 4# 2 x 20 reps  TE   Alternating side kicks 4# 2 x 20 reps   TE    Squats 4# 3 x 10 reps     Sit to stands  2 x 10 reps

## 2022-03-16 DIAGNOSIS — Z96.641 STATUS POST RIGHT HIP REPLACEMENT: Primary | ICD-10-CM

## 2022-03-17 ENCOUNTER — OFFICE VISIT (OUTPATIENT)
Dept: ORTHOPEDIC SURGERY | Age: 77
End: 2022-03-17

## 2022-03-17 ENCOUNTER — TREATMENT (OUTPATIENT)
Dept: PHYSICAL THERAPY | Age: 77
End: 2022-03-17
Payer: MEDICARE

## 2022-03-17 VITALS — BODY MASS INDEX: 31.5 KG/M2 | HEIGHT: 70 IN | WEIGHT: 220 LBS | TEMPERATURE: 98 F

## 2022-03-17 DIAGNOSIS — Z96.641 STATUS POST RIGHT HIP REPLACEMENT: Primary | ICD-10-CM

## 2022-03-17 PROCEDURE — 97110 THERAPEUTIC EXERCISES: CPT

## 2022-03-17 PROCEDURE — 99024 POSTOP FOLLOW-UP VISIT: CPT | Performed by: ORTHOPAEDIC SURGERY

## 2022-03-17 PROCEDURE — 97112 NEUROMUSCULAR REEDUCATION: CPT

## 2022-03-17 NOTE — PROGRESS NOTES
Physical Therapy Daily Treatment Note    Date: 3/17/2022  Patient Name: Latanya Fowler  : 1945   MRN: [id number]  DOInjury: --   DOSx: 2022    Referring Provider:   DO Arvind Craft [address] [place] [address] Diagnosis:    Diagnosis Orders   1. Status post right hip replacement       Access Code: [email or web address]  URL: [email or web address]  Date: 2022  Prepared by: Brittnee Crutch    Program Notes  Proper Elevation    -- Elevate limb on a stable stack of blankets / pillows so leg is higher than heart. -- Do this for 45 minutes, 2-3 times a day  -- You may apply ice for the first 15 minutes, then remove it while you continue your elevation  -- Be sure to lie flat in bed or on a couch with a comfortable pillow under your head. Lying back in a recliner is not helpful.  -- You may prop your head up if you cannot tolerate lying completely flat, but only as high as necessary for comfort. Exercises  Clamshell - 1 x daily - 3 sets - 10 reps  Supine Bridge - 1 x daily - 3 sets - 10 reps    X = TO BE PERFORMED NEXT VISIT  > = PROGRESS TO THIS    Precautions/Contraindications: posterior / lateral DENIZ precautions     S: Patient was sore yesterday from walking a total of 3 miles yesterday and also went to gym. Patient feels 70% better since start of therapy. Patient wants to get back to walking 3-4 miles or 10,000 steps a day without getting tired; right now getting 4,000. O:  Time 3013-1699       Visit -  1-2/wk x 6 wks Repeat outcome measure at mid point and end.      Pain Pain 0/10       ROM 90° Flexion,  10° Extension, 30° Abduction,  30° ER, 10° IR       Modalities         Ice     MO   Stretch             TE   Exercise         Bike  L8 10 min  TE   Glute sets   TE   Quad sets    TE   Clamshell HEP TE   S-lying hip ABD  \" TE   Bridging 2-leg  \" TE               Marching in place 4# 2 x 20 reps  TE   Alternating side kicks 4# 2 x 20 reps   TE    Squats 4# 3 x 10 reps     Sit to stands  2 x 10 reps  NR         Step up -- FWD 4# 7\" 25 reps    TE   Step up -- LAT 4# 7\" 25 reps    TE   Leg Press 60# 3 x 15    TE   Knee Extension Machine 10# 3 x 10    TE    Hamstring curl  60# 3 x 15     Hallway marching for balance and proprioception   45 ft x 2    NR   Hallway side stepping for balance and proprioception   45 ft x 2    NR                     A: Tolerated well. Discussed anatomy, physiology, body mechanics, principles of loading, and progressive loading/activity.   P: Continue with rehab plan  Trevor Davison PTA    Treatment Charges: Mins Units   Initial Evaluation     Re-Evaluation     Ther Exercise         TE 30 2   Manual Therapy     MT     Ther Activities        TA     Gait Training          GT     Neuro Re-education NR 10 1   Modalities     Non-Billable Service Time     Other     Total Time/Units 40 3

## 2022-03-17 NOTE — PROGRESS NOTES
Subjective:     Snow Sesay is now 6 weeks post right total hip arthroplasty, DOS: 2/2/22. Pain is controlled without any medications. The patient denies  fever, wound drainage, increasing redness, pus, increasing pain, increasing swelling. Post op problems reported:  none. He is ambulating without device     Objective:      General :    alert, appears stated age and cooperative   Gait:  Antalgic. Sutures:   staples out   Incision:  healing well, no significant drainage, no dehiscence, no significant erythema   Tenderness:  none   Flexion ROM:  full range of motion   Extension ROM:  full range of motion   Abduction ROM:  full range of motion   DVT Evaluation:  No evidence of DVT seen on physical exam.     Imaging:  Not performed today  Radiographic findings reviewed with patient     Assessment:     Encounter Diagnosis   Name Primary?  Status post right hip replacement Yes       Plan:     He will complete his PT and resume activities as tolerated. I will see him back in 2 months. I reminded the patient about the hip precautions and antibiotics prior to dental procedures and endoscopies.

## 2022-03-22 ENCOUNTER — TREATMENT (OUTPATIENT)
Dept: PHYSICAL THERAPY | Age: 77
End: 2022-03-22
Payer: MEDICARE

## 2022-03-22 DIAGNOSIS — Z96.641 STATUS POST RIGHT HIP REPLACEMENT: Primary | ICD-10-CM

## 2022-03-22 PROCEDURE — 97112 NEUROMUSCULAR REEDUCATION: CPT

## 2022-03-22 PROCEDURE — 97110 THERAPEUTIC EXERCISES: CPT

## 2022-03-22 NOTE — PROGRESS NOTES
Physical Therapy Daily Treatment Note    Date: 3/22/2022  Patient Name: Marisol Weinstein  : 1945   MRN: [id number]  DOInjury: --   DOSx: 2022    Referring Provider:   Perry Saha DO  [address]  [facility] Diagnosis:    Diagnosis Orders   1. Status post right hip replacement       Access Code: [email or web address]  URL: [email or web address]  Date: 2022  Prepared by: Maite Purvis    Program Notes  Proper Elevation    -- Elevate limb on a stable stack of blankets / pillows so leg is higher than heart. -- Do this for 45 minutes, 2-3 times a day  -- You may apply ice for the first 15 minutes, then remove it while you continue your elevation  -- Be sure to lie flat in bed or on a couch with a comfortable pillow under your head. Lying back in a recliner is not helpful.  -- You may prop your head up if you cannot tolerate lying completely flat, but only as high as necessary for comfort. Exercises  Clamshell - 1 x daily - 3 sets - 10 reps  Supine Bridge - 1 x daily - 3 sets - 10 reps    X = TO BE PERFORMED NEXT VISIT  > = PROGRESS TO THIS    Precautions/Contraindications: posterior / lateral DENIZ precautions     S: Patient walked a lot over weekend, about 2 miles each time, with minimal discomfort. O:  Time 5535-4656       Visit -12  1-2/wk x 6 wks Repeat outcome measure at mid point and end.      Pain Pain 0/10       ROM 90° Flexion,  10° Extension, 30° Abduction,  30° ER, 10° IR       Modalities         Ice     MO   Stretch             TE   Exercise         Bike  L8 10 min  TE   Glute sets   TE   Quad sets    TE   Clamshell HEP TE   S-lying hip ABD  \" TE   Bridging 2-leg  \" TE               Marching in place 4# 2 x 20 reps  TE   Alternating side kicks 4# 2 x 20 reps   TE    Squats 4# 3 x 10 reps     Sit to stands  2 x 10 reps  NR         Step up -- FWD 4# 7\" 25 reps    TE   Step up -- LAT 4# 7\" 25 reps    TE   Leg Press 60# 3 x 15    TE   Knee Extension Machine 10# 3 x 10    TE    Hamstring curl  60# 3 x 15     Hallway marching for balance and proprioception   45 ft x 2    NR   Hallway side stepping for balance and proprioception   45 ft x 2    NR                     A: Tolerated well. Discussed anatomy, physiology, body mechanics, principles of loading, and progressive loading/activity.   P: Continue with rehab plan  Trevor Davison PTA    Treatment Charges: Mins Units   Initial Evaluation     Re-Evaluation     Ther Exercise         TE 30 2   Manual Therapy     MT     Ther Activities        TA     Gait Training          GT     Neuro Re-education NR 10 1   Modalities     Non-Billable Service Time     Other     Total Time/Units 40 3

## 2022-03-24 ENCOUNTER — TREATMENT (OUTPATIENT)
Dept: PHYSICAL THERAPY | Age: 77
End: 2022-03-24
Payer: MEDICARE

## 2022-03-24 DIAGNOSIS — Z96.641 STATUS POST RIGHT HIP REPLACEMENT: Primary | ICD-10-CM

## 2022-03-24 PROCEDURE — 97112 NEUROMUSCULAR REEDUCATION: CPT

## 2022-03-24 PROCEDURE — 97110 THERAPEUTIC EXERCISES: CPT

## 2022-03-24 NOTE — PROGRESS NOTES
Physical Therapy Daily Treatment Note    Date: 3/24/2022  Patient Name: Sheryl Miles  : 1945   MRN: [id number]  DOInjury: --   DOSx: 2022    Referring Provider:   Mona Kennedy DO  [address] [facility] Diagnosis:    Diagnosis Orders   1. Status post right hip replacement       Access Code: [email or web address]  URL: [email or web address]  Date: 2022  Prepared by: Mellissa Jacob    Program Notes  Proper Elevation    -- Elevate limb on a stable stack of blankets / pillows so leg is higher than heart. -- Do this for 45 minutes, 2-3 times a day  -- You may apply ice for the first 15 minutes, then remove it while you continue your elevation  -- Be sure to lie flat in bed or on a couch with a comfortable pillow under your head. Lying back in a recliner is not helpful.  -- You may prop your head up if you cannot tolerate lying completely flat, but only as high as necessary for comfort. Exercises  Clamshell - 1 x daily - 3 sets - 10 reps  Supine Bridge - 1 x daily - 3 sets - 10 reps    X = TO BE PERFORMED NEXT VISIT  > = PROGRESS TO THIS    Precautions/Contraindications: posterior / lateral DENIZ precautions     S: Patient feeling not too bad. O:  Time 8425-7457       Visit 10/6-  1-2/wk x 6 wks Repeat outcome measure at mid point and end.      Pain Pain 0/10       ROM 90° Flexion,  10° Extension, 30° Abduction,  30° ER, 10° IR       Modalities         Ice     MO   Stretch             TE   Exercise         Bike  L8 10 min  TE   Glute sets   TE   Quad sets    TE   Clamshell HEP TE   S-lying hip ABD  \" TE   Bridging 2-leg  \" TE               Marching in place 4# 2 x 20 reps  TE   Alternating side kicks 4# 2 x 20 reps   TE    Squats 4# 3 x 10 reps     Sit to stands  2 x 10 reps  NR         Step up -- FWD 4# 7\" 25 reps    TE   Step up -- LAT 4# 7\" 25 reps    TE   Leg Press 60# 3 x 15    TE   Knee Extension Machine 10# 3 x 10    TE    Hamstring curl  60# 3 x 15 Hallway marching for balance and proprioception   45 ft x 2    NR   Hallway side stepping for balance and proprioception   45 ft x 2    NR                     A: Tolerated well. Discussed anatomy, physiology, body mechanics, principles of loading, and progressive loading/activity.   P: Continue with rehab plan  Trevor Davison PTA    Treatment Charges: Mins Units   Initial Evaluation     Re-Evaluation     Ther Exercise         TE 30 2   Manual Therapy     MT     Ther Activities        TA     Gait Training          GT     Neuro Re-education NR 10 1   Modalities     Non-Billable Service Time     Other     Total Time/Units 40 3

## 2022-03-29 ENCOUNTER — TREATMENT (OUTPATIENT)
Dept: PHYSICAL THERAPY | Age: 77
End: 2022-03-29
Payer: MEDICARE

## 2022-03-29 DIAGNOSIS — Z96.641 STATUS POST RIGHT HIP REPLACEMENT: Primary | ICD-10-CM

## 2022-03-29 PROCEDURE — 97112 NEUROMUSCULAR REEDUCATION: CPT

## 2022-03-29 PROCEDURE — 97110 THERAPEUTIC EXERCISES: CPT

## 2022-03-29 NOTE — PROGRESS NOTES
Physical Therapy Daily Treatment Note    Date: 3/29/2022  Patient Name: Amairani Melgar  : 1945   MRN: [id number]  DOInjury: --   DOSx: 2022    Referring Provider:   Dustin Walsh DO  [address] [facility] Diagnosis:    Diagnosis Orders   1. Status post right hip replacement       Access Code: [email or web address]  URL: [email or web address]  Date: 2022  Prepared by: Rocky Andrade    Program Notes  Proper Elevation    -- Elevate limb on a stable stack of blankets / pillows so leg is higher than heart. -- Do this for 45 minutes, 2-3 times a day  -- You may apply ice for the first 15 minutes, then remove it while you continue your elevation  -- Be sure to lie flat in bed or on a couch with a comfortable pillow under your head. Lying back in a recliner is not helpful.  -- You may prop your head up if you cannot tolerate lying completely flat, but only as high as necessary for comfort. Exercises  Clamshell - 1 x daily - 3 sets - 10 reps  Supine Bridge - 1 x daily - 3 sets - 10 reps    X = TO BE PERFORMED NEXT VISIT  > = PROGRESS TO THIS    Precautions/Contraindications: posterior / lateral DENIZ precautions     S: Patient took a 1.5 mile walk and the hip felt good. Patient feels good as he ever was. O:  Time 2206-8248       Visit -  1-2/wk x 6 wks Repeat outcome measure at mid point and end.      Pain Pain 0/10       ROM 90° Flexion,  10° Extension, 30° Abduction,  30° ER, 10° IR       Modalities         Ice     MO   Stretch             TE   Exercise         Bike  L8 10 min  TE   Glute sets   TE   Quad sets    TE   Clamshell HEP TE   S-lying hip ABD  \" TE   Bridging 2-leg  \" TE               Marching in place 4# 2 x 20 reps  TE   Alternating side kicks 4# 2 x 20 reps   TE    Squats 4# 3 x 10 reps     Sit to stands  2 x 10 reps Green mat NR         Step up -- FWD 4# 7\" 25 reps    TE   Step up -- LAT 4# 7\" 25 reps    TE   Leg Press 60# 3 x 15    TE   Knee Extension Machine 10# 3 x 15   TE    Hamstring curl  60# 3 x 15     Hallway marching for balance and proprioception   45 ft x 2    NR   Hallway side stepping for balance and proprioception   45 ft x 2    NR                     A: Tolerated well. Discussed anatomy, physiology, body mechanics, principles of loading, and progressive loading/activity.   P: Continue with rehab plan  Fernanda Gibson PTA    Treatment Charges: Mins Units   Initial Evaluation     Re-Evaluation     Ther Exercise         TE 30 2   Manual Therapy     MT     Ther Activities        TA     Gait Training          GT     Neuro Re-education NR 10 1   Modalities     Non-Billable Service Time     Other     Total Time/Units 40 3

## 2022-03-31 ENCOUNTER — TREATMENT (OUTPATIENT)
Dept: PHYSICAL THERAPY | Age: 77
End: 2022-03-31
Payer: MEDICARE

## 2022-03-31 DIAGNOSIS — Z96.641 STATUS POST RIGHT HIP REPLACEMENT: Primary | ICD-10-CM

## 2022-03-31 PROCEDURE — 97110 THERAPEUTIC EXERCISES: CPT

## 2022-03-31 PROCEDURE — 97112 NEUROMUSCULAR REEDUCATION: CPT

## 2022-03-31 NOTE — PROGRESS NOTES
Physical Therapy Daily Treatment Note    Date: 3/31/2022  Patient Name: Vinay Mejia  : 1945   MRN: [id number]  DOInjury: --   DOSx: 2022    Referring Provider:   Bipin Mcbride DO  [address] [facility] Diagnosis:    Diagnosis Orders   1. Status post right hip replacement       Access Code: [email or web address]  URL: [email or web address]  Date: 2022  Prepared by: Lawrence Shen    Program Notes  Proper Elevation    -- Elevate limb on a stable stack of blankets / pillows so leg is higher than heart. -- Do this for 45 minutes, 2-3 times a day  -- You may apply ice for the first 15 minutes, then remove it while you continue your elevation  -- Be sure to lie flat in bed or on a couch with a comfortable pillow under your head. Lying back in a recliner is not helpful.  -- You may prop your head up if you cannot tolerate lying completely flat, but only as high as necessary for comfort. Exercises  Clamshell - 1 x daily - 3 sets - 10 reps  Supine Bridge - 1 x daily - 3 sets - 10 reps    X = TO BE PERFORMED NEXT VISIT  > = PROGRESS TO THIS    Precautions/Contraindications: posterior / lateral DENIZ precautions     S: Patient reports no changes. He walked 12,000 steps by end of day yesterday and felt good. O:  Time 4431-4818       Visit -12  1-2/wk x 6 wks Repeat outcome measure at mid point and end.      Pain Pain 0/10       ROM 90° Flexion,  10° Extension, 30° Abduction,  30° ER, 10° IR       Modalities         Ice     MO   Stretch             TE   Exercise         Bike  L8 10 min  TE   Glute sets   TE   Quad sets    TE   Clamshell HEP TE   S-lying hip ABD  \" TE   Bridging 2-leg  \" TE               Marching in place 4# 2 x 20 reps  TE   Alternating side kicks 4# 2 x 20 reps   TE    Squats 4# 3 x 10 reps     Sit to stands  2 x 10 reps Green mat NR         Step up -- FWD 4# 7\" 25 reps    TE   Step up -- LAT 4# 7\" 25 reps    TE   Leg Press 60# 3 x 15    TE Knee Extension Machine 10# 3 x 15   TE    Hamstring curl  60# 3 x 15     Hallway marching for balance and proprioception   45 ft x 2    NR   Hallway side stepping for balance and proprioception   45 ft x 2    NR                     A: Tolerated well. P: Today was patient's last session and they are to continue with HEP.   Fernanda Gibson PTA    Treatment Charges: Mins Units   Initial Evaluation     Re-Evaluation     Ther Exercise         TE 30 2   Manual Therapy     MT     Ther Activities        TA     Gait Training          GT     Neuro Re-education NR 10 1   Modalities     Non-Billable Service Time     Other     Total Time/Units 40 3

## 2022-05-16 DIAGNOSIS — Z96.641 STATUS POST RIGHT HIP REPLACEMENT: Primary | ICD-10-CM

## 2022-05-17 ENCOUNTER — OFFICE VISIT (OUTPATIENT)
Dept: ORTHOPEDIC SURGERY | Age: 77
End: 2022-05-17
Payer: MEDICARE

## 2022-05-17 VITALS — HEIGHT: 70 IN | TEMPERATURE: 98 F | BODY MASS INDEX: 31.5 KG/M2 | WEIGHT: 220 LBS

## 2022-05-17 DIAGNOSIS — Z96.641 STATUS POST RIGHT HIP REPLACEMENT: Primary | ICD-10-CM

## 2022-05-17 PROCEDURE — 1036F TOBACCO NON-USER: CPT | Performed by: ORTHOPAEDIC SURGERY

## 2022-05-17 PROCEDURE — 1123F ACP DISCUSS/DSCN MKR DOCD: CPT | Performed by: ORTHOPAEDIC SURGERY

## 2022-05-17 PROCEDURE — 99212 OFFICE O/P EST SF 10 MIN: CPT | Performed by: ORTHOPAEDIC SURGERY

## 2022-05-17 PROCEDURE — 4040F PNEUMOC VAC/ADMIN/RCVD: CPT | Performed by: ORTHOPAEDIC SURGERY

## 2022-05-17 PROCEDURE — G8427 DOCREV CUR MEDS BY ELIG CLIN: HCPCS | Performed by: ORTHOPAEDIC SURGERY

## 2022-05-17 PROCEDURE — G8417 CALC BMI ABV UP PARAM F/U: HCPCS | Performed by: ORTHOPAEDIC SURGERY

## 2022-05-17 NOTE — PROGRESS NOTES
Chief Complaint   Patient presents with    Hip Pain     Right DENIZ DOS 02/02/2022       Annitta Holter returns today for follow-up of his right DENIZ. DOS: 2/2/22. He reports this is better than when I saw the patient last.     Past Medical History:   Diagnosis Date    COVID-19 12/2021     Past Surgical History:   Procedure Laterality Date    COLONOSCOPY      TOTAL HIP ARTHROPLASTY Right 2/2/2022    RIGHT HIP TOTAL ARTHROPLASTY ([address]) performed by Iwona Nunez DO at [address]       Current Outpatient Medications:     vitamin D 25 MCG (1000 UT) CAPS, Vitamin D3 25 mcg (1,000 unit) capsule  TAKE 1 CAPSULE BY MOUTH EVERY DAY, Disp: , Rfl:     COVID-19 mRNA Vacc, Moderna, 100 MCG/0.5ML SUSP injection, Moderna COVID-19 Vaccine (PF) 100 mcg/0.5 mL intramuscular susp. (EUA)  ADMINISTER ONE SHOT INTRAMUSCULARLY INTO THE LEFT DELTOID, Disp: , Rfl:     celecoxib (CELEBREX) 100 MG capsule, Take 1 capsule by mouth 2 times daily, Disp: 60 capsule, Rfl: 0    pravastatin (PRAVACHOL) 20 MG tablet, TK 1 T PO QD HS, Disp: , Rfl: 1    aspirin 325 MG EC tablet, Take 1 tablet by mouth 2 times daily for 28 days, Disp: 56 tablet, Rfl: 0    gabapentin (NEURONTIN) 100 MG capsule, Take 1 capsule by mouth 3 times daily for 30 days.  Intended supply: 30 days, Disp: 90 capsule, Rfl: 0  No Known Allergies  Social History     Socioeconomic History    Marital status: Unknown     Spouse name: Not on file    Number of children: Not on file    Years of education: Not on file    Highest education level: Not on file   Occupational History    Not on file   Tobacco Use    Smoking status: Former Smoker    Smokeless tobacco: Never Used   Substance and Sexual Activity    Alcohol use: Not on file     Comment: rarely    Drug use: Never    Sexual activity: Not on file   Other Topics Concern    Not on file   Social History Narrative    Not on file     Social Determinants of Health     Financial Resource Strain:     Difficulty of Paying Living Expenses: Not on file   Food Insecurity:     Worried About [address] [facility] in the Last Year: Not on file    Edu of Food in the Last Year: Not on file   Transportation Needs:     Lack of Transportation (Medical): Not on file    Lack of Transportation (Non-Medical): Not on file   Physical Activity:     Days of Exercise per Week: Not on file    Minutes of Exercise per Session: Not on file   Stress:     Feeling of Stress : Not on file   Social Connections:     Frequency of Communication with Friends and Family: Not on file    Frequency of Social Gatherings with Friends and Family: Not on file    Attends Anabaptism Services: Not on file    Active Member of [address] [place] [address] or Organizations: Not on file    Attends Club or Organization Meetings: Not on file    Marital Status: Not on file   Intimate Partner Violence:     Fear of Current or Ex-Partner: Not on file    Emotionally Abused: Not on file    Physically Abused: Not on file    Sexually Abused: Not on file   Housing Stability:     Unable to Pay for Housing in the Last Year: Not on file    Number of [place] in the Last Year: Not on file    Unstable Housing in the Last Year: Not on file       Review of Systems:   Skin: (-) rash,(-) psoriasis,(-) eczema, (-)skin cancer. Musculoskeletal: (-) fractures,  (-) dislocations,(-) collagen vascular disease, (-) fibromyalgia, (-) multiple sclerosis, (-) muscular dystrophy, (-) RSD,(-) joint pain (-)swelling, (-) joint pain,swelling. Neurologic: (-) epilepsy, (-)seizures,(-) brain tumor,(-) TIA, (-)stroke, (-)headaches, (-)Parkinson disease,(-) memory loss, (-) LOC. Cardiovascular: (-) Chest pain, (-) swelling in legs/feet, (-) SOB, (-) cramping in legs/feet with walking. Subjective:    Constitutional:  The patient is alert and oriented x 3, appears to be stated age and in no distress.    Temp 98 °F (36.7 °C)   Ht 5' 10\" (1.778 m)   Wt 220 lb (99.8 kg)   BMI 31.57 kg/m²     Skin:  Upon inspection: the skin appears warm, dry and intact. There is a previous scar over the affected area. There is not any cellulitis, lymphedema or cutaneous lesions noted in the lower extremities. Upon palpation there is no induration noted. Neurologic:  Gait: normal;  Motor exam of the lower extremities show ; quadriceps, hamstrings, foot dorsi and plantar flexors intact R.  5/5 and L. 5/5. Deep tendon reflexes are 2/4 at the knees and 2/4 at the ankles with strong extensor hallicus longus motor strength bilaterally. Sensory to both feet is intact to all sensory roots. Cardiovascular: The vascular exam is normal and is well perfused to distal extremities. Distal pulses DP/PT: R. 2+; L. 2+. There is cap refill noted less than two seconds in all digits. There is not edema of the bilateral lower extremities. There is not varicosities noted in the distal extremities. Lymph:  Upon palpation,  there is no lymphadenopathy noted in bilateral lower extremities. Musculoskeletal:    Lumbar exam:  On visual inspection, there is not deformity of the spine. full range of motion, no tenderness, palpable spasm or pain on motion. Special tests: Straight Leg Raise negative, Skylar testnegative. Hip Exam:  Exam of the right hip shows none leg length discrepancy. He has a normal gait Range of motion of the involved hip is normal and full, the hip joint is stable to testing. Strength of the lower extremity is normal. Patient deneisof tenderness at the  groin. Knee exam :  bilateral knee exam shows;  range of motion of R. Knee is 0 to 120, and L. Knee is 0 to 120. The patient does not have  pain on motion, there is not an effusion, there is not tenderness over the  medial, lateral, anterior region, there are not any masses, there is not ligamentous instability, there is not  deformity noted.       Xrays: right DENIZ well aligned with no signs of aseptic loosening    Impression:   Encounter Diagnosis   Name Primary?     Status post right hip replacement Yes       Plan:   Hep  Activity as tolerated  Fu in 3 months with xr

## 2022-08-11 DIAGNOSIS — Z96.641 STATUS POST RIGHT HIP REPLACEMENT: Primary | ICD-10-CM

## 2022-08-15 ENCOUNTER — OFFICE VISIT (OUTPATIENT)
Dept: ORTHOPEDIC SURGERY | Age: 77
End: 2022-08-15
Payer: MEDICARE

## 2022-08-15 VITALS — WEIGHT: 220 LBS | TEMPERATURE: 98 F | BODY MASS INDEX: 31.5 KG/M2 | HEIGHT: 70 IN

## 2022-08-15 DIAGNOSIS — Z96.641 STATUS POST RIGHT HIP REPLACEMENT: Primary | ICD-10-CM

## 2022-08-15 PROCEDURE — 1036F TOBACCO NON-USER: CPT | Performed by: ORTHOPAEDIC SURGERY

## 2022-08-15 PROCEDURE — 99213 OFFICE O/P EST LOW 20 MIN: CPT | Performed by: ORTHOPAEDIC SURGERY

## 2022-08-15 PROCEDURE — 1123F ACP DISCUSS/DSCN MKR DOCD: CPT | Performed by: ORTHOPAEDIC SURGERY

## 2022-08-15 PROCEDURE — G8427 DOCREV CUR MEDS BY ELIG CLIN: HCPCS | Performed by: ORTHOPAEDIC SURGERY

## 2022-08-15 PROCEDURE — G8417 CALC BMI ABV UP PARAM F/U: HCPCS | Performed by: ORTHOPAEDIC SURGERY

## 2022-08-15 RX ORDER — CEPHALEXIN 500 MG/1
2000 CAPSULE ORAL DAILY PRN
Qty: 4 CAPSULE | Refills: 0 | Status: SHIPPED | OUTPATIENT
Start: 2022-08-15

## 2022-08-15 RX ORDER — CEPHALEXIN 500 MG/1
2000 CAPSULE ORAL DAILY PRN
Qty: 4 CAPSULE | Refills: 0 | Status: SHIPPED
Start: 2022-08-15 | End: 2022-08-15 | Stop reason: SDUPTHER

## 2023-02-10 DIAGNOSIS — Z96.641 STATUS POST RIGHT HIP REPLACEMENT: Primary | ICD-10-CM

## 2023-02-14 ENCOUNTER — OFFICE VISIT (OUTPATIENT)
Dept: ORTHOPEDIC SURGERY | Age: 78
End: 2023-02-14

## 2023-02-14 VITALS — HEIGHT: 70 IN | WEIGHT: 220 LBS | TEMPERATURE: 98 F | BODY MASS INDEX: 31.5 KG/M2

## 2023-02-14 DIAGNOSIS — Z96.641 STATUS POST RIGHT HIP REPLACEMENT: Primary | ICD-10-CM

## 2023-02-14 NOTE — PROGRESS NOTES
Chief Complaint   Patient presents with    Hip Pain     Right DENIZ DOS 2/2/22 doing well last 2 weeks has become sore       Remedios Dears returns today for follow-up of his right DENIZ. DOS: 2/2/22. He reports this is better than when I saw the patient last.     Past Medical History:   Diagnosis Date    COVID-19 12/2021     Past Surgical History:   Procedure Laterality Date    COLONOSCOPY      TOTAL HIP ARTHROPLASTY Right 2/2/2022    RIGHT HIP TOTAL ARTHROPLASTY ([address]) performed by Analilia Robbins DO at [address]       Current Outpatient Medications:     cephALEXin (KEFLEX) 500 MG capsule, Take 4 capsules by mouth daily as needed (take 2 g, 1 hour prior to dental procedure), Disp: 4 capsule, Rfl: 0    vitamin D 25 MCG (1000 UT) CAPS, Vitamin D3 25 mcg (1,000 unit) capsule  TAKE 1 CAPSULE BY MOUTH EVERY DAY, Disp: , Rfl:     COVID-19 mRNA Vacc, Moderna, 100 MCG/0.5ML SUSP injection, Moderna COVID-19 Vaccine (PF) 100 mcg/0.5 mL intramuscular susp. (EUA)  ADMINISTER ONE SHOT INTRAMUSCULARLY INTO THE LEFT DELTOID, Disp: , Rfl:     celecoxib (CELEBREX) 100 MG capsule, Take 1 capsule by mouth 2 times daily, Disp: 60 capsule, Rfl: 0    pravastatin (PRAVACHOL) 20 MG tablet, TK 1 T PO QD HS, Disp: , Rfl: 1    aspirin 325 MG EC tablet, Take 1 tablet by mouth 2 times daily for 28 days, Disp: 56 tablet, Rfl: 0    gabapentin (NEURONTIN) 100 MG capsule, Take 1 capsule by mouth 3 times daily for 30 days.  Intended supply: 30 days, Disp: 90 capsule, Rfl: 0  No Known Allergies  Social History     Socioeconomic History    Marital status: Unknown     Spouse name: Not on file    Number of children: Not on file    Years of education: Not on file    Highest education level: Not on file   Occupational History    Not on file   Tobacco Use    Smoking status: Former    Smokeless tobacco: Never   Substance and Sexual Activity    Alcohol use: Not on file     Comment: rarely    Drug use: Never    Sexual activity: Not on file   Other Topics Concern Not on file   Social History Narrative    Not on file     Social Determinants of Health     Financial Resource Strain: Not on file   Food Insecurity: Not on file   Transportation Needs: Not on file   Physical Activity: Not on file   Stress: Not on file   Social Connections: Not on file   Intimate Partner Violence: Not on file   Housing Stability: Not on file       Review of Systems:   Skin: (-) rash,(-) psoriasis,(-) eczema, (-)skin cancer. Musculoskeletal: (-) fractures,  (-) dislocations,(-) collagen vascular disease, (-) fibromyalgia, (-) multiple sclerosis, (-) muscular dystrophy, (-) RSD,(-) joint pain (-)swelling, (-) joint pain,swelling. Neurologic: (-) epilepsy, (-)seizures,(-) brain tumor,(-) TIA, (-)stroke, (-)headaches, (-)Parkinson disease,(-) memory loss, (-) LOC. Cardiovascular: (-) Chest pain, (-) swelling in legs/feet, (-) SOB, (-) cramping in legs/feet with walking. Subjective:    Constitutional:  The patient is alert and oriented x 3, appears to be stated age and in no distress. Temp 98 °F (36.7 °C)   Ht 5' 10\" (1.778 m)   Wt 220 lb (99.8 kg)   BMI 31.57 kg/m²     Skin:  Upon inspection: the skin appears warm, dry and intact. There is a previous scar over the affected area. There is not any cellulitis, lymphedema or cutaneous lesions noted in the lower extremities. Upon palpation there is no induration noted. Neurologic:  Gait: normal;  Motor exam of the lower extremities show ; quadriceps, hamstrings, foot dorsi and plantar flexors intact R.  5/5 and L. 5/5. Deep tendon reflexes are 2/4 at the knees and 2/4 at the ankles with strong extensor hallicus longus motor strength bilaterally. Sensory to both feet is intact to all sensory roots. Cardiovascular: The vascular exam is normal and is well perfused to distal extremities. Distal pulses DP/PT: R. 2+; L. 2+. There is cap refill noted less than two seconds in all digits.  There is not edema of the bilateral lower extremities. There is not varicosities noted in the distal extremities. Lymph:  Upon palpation,  there is no lymphadenopathy noted in bilateral lower extremities. Musculoskeletal:    Lumbar exam:  On visual inspection, there is not deformity of the spine. full range of motion, no tenderness, palpable spasm or pain on motion. Special tests: Straight Leg Raise negative, Skylar testnegative. Hip Exam:  Exam of the right hip shows none leg length discrepancy. He has a normal gait Range of motion of the involved hip is normal and full, the hip joint is stable to testing. Strength of the lower extremity is normal. Patient deneisof tenderness at the  groin. Knee exam :  bilateral knee exam shows;  range of motion of R. Knee is 0 to 120, and L. Knee is 0 to 120. The patient does not have  pain on motion, there is not an effusion, there is not tenderness over the  medial, lateral, anterior region, there are not any masses, there is not ligamentous instability, there is not  deformity noted. Xrays: right DENIZ well aligned with no signs of aseptic loosening    Impression:   Encounter Diagnosis   Name Primary?     Status post right hip replacement Yes       Plan:   Hep  Activity as tolerated  Fu in every 2 years with xr

## 2023-04-17 ENCOUNTER — TELEPHONE (OUTPATIENT)
Dept: ORTHOPEDIC SURGERY | Age: 78
End: 2023-04-17

## 2023-04-17 RX ORDER — CEPHALEXIN 500 MG/1
2000 CAPSULE ORAL DAILY PRN
Qty: 4 CAPSULE | Refills: 4 | Status: SHIPPED | OUTPATIENT
Start: 2023-04-17

## 2023-04-17 NOTE — TELEPHONE ENCOUNTER
Pending dental appointment, Previous Right DENIZ       . Last appointment 2/14/2023  Next appointment No future appointments.    Last refill   DOS:       Patient called in requesting refill of:    cephALEXin (KEFLEX) 500 MG capsule       [facility] [address]   [place] [address]

## 2024-04-29 RX ORDER — CEPHALEXIN 500 MG/1
2000 CAPSULE ORAL DAILY PRN
Qty: 4 CAPSULE | Refills: 4 | Status: SHIPPED | OUTPATIENT
Start: 2024-04-29

## 2024-04-29 NOTE — TELEPHONE ENCOUNTER
Patient is status post op DENIZ and is pending a dental appointment     .Last appointment 2/14/2023  Next appointment No future appointments.   Last refill   DOS:       Patient called in requesting refill of :    cephALEXin (KEFLEX) 500 MG capsule      [facility] #[id number] - [place], [place] - [address] - P [phone] - F [phone]

## 2025-03-31 RX ORDER — ASPIRIN 81 MG/1
81 TABLET ORAL DAILY
COMMUNITY

## 2025-04-02 ENCOUNTER — PREP FOR PROCEDURE (OUTPATIENT)
Dept: OPHTHALMOLOGY | Age: 80
End: 2025-04-02

## 2025-04-02 RX ORDER — SODIUM CHLORIDE 9 MG/ML
INJECTION, SOLUTION INTRAVENOUS PRN
Status: CANCELLED | OUTPATIENT
Start: 2025-04-02

## 2025-04-02 RX ORDER — PROPARACAINE HYDROCHLORIDE 5 MG/ML
1 SOLUTION/ DROPS OPHTHALMIC ONCE
Status: CANCELLED | OUTPATIENT
Start: 2025-04-04

## 2025-04-02 RX ORDER — SODIUM CHLORIDE 0.9 % (FLUSH) 0.9 %
5-40 SYRINGE (ML) INJECTION PRN
Status: CANCELLED | OUTPATIENT
Start: 2025-04-02

## 2025-04-02 RX ORDER — OFLOXACIN 3 MG/ML
1 SOLUTION/ DROPS OPHTHALMIC ONCE
Status: CANCELLED | OUTPATIENT
Start: 2025-04-04

## 2025-04-02 RX ORDER — PHENYLEPHRINE HYDROCHLORIDE 25 MG/ML
1 SOLUTION/ DROPS OPHTHALMIC SEE ADMIN INSTRUCTIONS
Status: CANCELLED | OUTPATIENT
Start: 2025-04-04

## 2025-04-02 RX ORDER — SODIUM CHLORIDE 9 MG/ML
INJECTION, SOLUTION INTRAVENOUS CONTINUOUS
Status: CANCELLED | OUTPATIENT
Start: 2025-04-02

## 2025-04-02 RX ORDER — SODIUM CHLORIDE 0.9 % (FLUSH) 0.9 %
5-40 SYRINGE (ML) INJECTION EVERY 12 HOURS SCHEDULED
Status: CANCELLED | OUTPATIENT
Start: 2025-04-02

## 2025-04-02 RX ORDER — TROPICAMIDE 10 MG/ML
1 SOLUTION/ DROPS OPHTHALMIC SEE ADMIN INSTRUCTIONS
Status: CANCELLED | OUTPATIENT
Start: 2025-04-04

## 2025-04-03 ENCOUNTER — ANESTHESIA EVENT (OUTPATIENT)
Dept: OPERATING ROOM | Age: 80
End: 2025-04-03
Payer: MEDICARE

## 2025-04-03 NOTE — ANESTHESIA PRE PROCEDURE
Department of Anesthesiology  Preprocedure Note       Name:  Maria T Muller   Age:  79 y.o.  :  1945                                          MRN:  [id number]         Date:  4/3/2025      Surgeon: Surgeon(s):  Angel De Leon MD    Procedure: Procedure(s):  LEFT EYE PARS PLANA VITRECTOMY INTERNAL LIMITING MEMBRANE PEEL BRILLIANT BLUE AIR FLUID EXCHANGE 25G    Medications prior to admission:   Prior to Admission medications    Medication Sig Start Date End Date Taking? Authorizing Provider   aspirin 81 MG EC tablet Take 1 tablet by mouth daily Stop per instruction of Dr. De Leon   Yes Randy, MD Gino   vitamin D 25 MCG (1000 UT) CAPS Vitamin D3 25 mcg (1,000 unit) capsule   TAKE 1 CAPSULE BY MOUTH EVERY DAY   Yes ProviderGino MD   pravastatin (PRAVACHOL) 20 MG tablet TK 1 T PO QD HS 19  Yes Gino Padilla MD   cephALEXin (KEFLEX) 500 MG capsule Take 4 capsules by mouth daily as needed (take 2 g, 1 hour prior to dental procedure) 24   Elvie White PA-C   COVID-19 mRNA Vacc, Moderna, 100 MCG/0.5ML SUSP injection Moderna COVID-19 Vaccine (PF) 100 mcg/0.5 mL intramuscular susp. (EUA)   ADMINISTER ONE SHOT INTRAMUSCULARLY INTO THE LEFT DELTOID    Provider, MD Gino       Current medications:    No current facility-administered medications for this encounter.     Current Outpatient Medications   Medication Sig Dispense Refill    aspirin 81 MG EC tablet Take 1 tablet by mouth daily Stop per instruction of Dr. De Leon      vitamin D 25 MCG (1000 UT) CAPS Vitamin D3 25 mcg (1,000 unit) capsule   TAKE 1 CAPSULE BY MOUTH EVERY DAY      pravastatin (PRAVACHOL) 20 MG tablet TK 1 T PO QD HS  1    cephALEXin (KEFLEX) 500 MG capsule Take 4 capsules by mouth daily as needed (take 2 g, 1 hour prior to dental procedure) 4 capsule 4    COVID-19 mRNA Vacc, Moderna, 100 MCG/0.5ML SUSP injection Moderna COVID-19 Vaccine (PF) 100 mcg/0.5 mL intramuscular susp. (EUA)

## 2025-04-04 ENCOUNTER — ANESTHESIA (OUTPATIENT)
Dept: OPERATING ROOM | Age: 80
End: 2025-04-04
Payer: MEDICARE

## 2025-04-04 ENCOUNTER — HOSPITAL ENCOUNTER (OUTPATIENT)
Age: 80
Setting detail: OUTPATIENT SURGERY
Discharge: HOME | End: 2025-04-04
Attending: OPHTHALMOLOGY | Admitting: OPHTHALMOLOGY
Payer: MEDICARE

## 2025-04-04 VITALS
SYSTOLIC BLOOD PRESSURE: 105 MMHG | OXYGEN SATURATION: 96 % | WEIGHT: 230 LBS | HEIGHT: 70 IN | DIASTOLIC BLOOD PRESSURE: 62 MMHG | BODY MASS INDEX: 32.93 KG/M2 | TEMPERATURE: 97.3 F | RESPIRATION RATE: 16 BRPM | HEART RATE: 74 BPM

## 2025-04-04 PROCEDURE — 7100000010 HC PHASE II RECOVERY - FIRST 15 MIN: Performed by: OPHTHALMOLOGY

## 2025-04-04 PROCEDURE — 6370000000 HC RX 637 (ALT 250 FOR IP): Performed by: PHYSICIAN ASSISTANT

## 2025-04-04 PROCEDURE — 2709999900 HC NON-CHARGEABLE SUPPLY: Performed by: OPHTHALMOLOGY

## 2025-04-04 PROCEDURE — 6360000002 HC RX W HCPCS

## 2025-04-04 PROCEDURE — 2500000003 HC RX 250 WO HCPCS: Performed by: PHYSICIAN ASSISTANT

## 2025-04-04 PROCEDURE — 2580000003 HC RX 258: Performed by: ANESTHESIOLOGY

## 2025-04-04 PROCEDURE — 3600000003 HC SURGERY LEVEL 3 BASE: Performed by: OPHTHALMOLOGY

## 2025-04-04 PROCEDURE — 3600000013 HC SURGERY LEVEL 3 ADDTL 15MIN: Performed by: OPHTHALMOLOGY

## 2025-04-04 PROCEDURE — 7100000011 HC PHASE II RECOVERY - ADDTL 15 MIN: Performed by: OPHTHALMOLOGY

## 2025-04-04 PROCEDURE — 3700000000 HC ANESTHESIA ATTENDED CARE: Performed by: OPHTHALMOLOGY

## 2025-04-04 PROCEDURE — 6360000002 HC RX W HCPCS: Performed by: OPHTHALMOLOGY

## 2025-04-04 PROCEDURE — 2500000003 HC RX 250 WO HCPCS: Performed by: OPHTHALMOLOGY

## 2025-04-04 PROCEDURE — 3700000001 HC ADD 15 MINUTES (ANESTHESIA): Performed by: OPHTHALMOLOGY

## 2025-04-04 PROCEDURE — 6370000000 HC RX 637 (ALT 250 FOR IP): Performed by: OPHTHALMOLOGY

## 2025-04-04 RX ORDER — ONDANSETRON 2 MG/ML
INJECTION INTRAMUSCULAR; INTRAVENOUS
Status: DISCONTINUED | OUTPATIENT
Start: 2025-04-04 | End: 2025-04-04 | Stop reason: SDUPTHER

## 2025-04-04 RX ORDER — NEOMYCIN SULFATE, POLYMYXIN B SULFATE, AND DEXAMETHASONE 3.5; 10000; 1 MG/G; [USP'U]/G; MG/G
OINTMENT OPHTHALMIC PRN
Status: DISCONTINUED | OUTPATIENT
Start: 2025-04-04 | End: 2025-04-04 | Stop reason: ALTCHOICE

## 2025-04-04 RX ORDER — PHENYLEPHRINE HYDROCHLORIDE 25 MG/ML
1 SOLUTION/ DROPS OPHTHALMIC SEE ADMIN INSTRUCTIONS
Status: DISCONTINUED | OUTPATIENT
Start: 2025-04-04 | End: 2025-04-04 | Stop reason: HOSPADM

## 2025-04-04 RX ORDER — MIDAZOLAM HYDROCHLORIDE 1 MG/ML
INJECTION, SOLUTION INTRAMUSCULAR; INTRAVENOUS
Status: DISCONTINUED | OUTPATIENT
Start: 2025-04-04 | End: 2025-04-04 | Stop reason: SDUPTHER

## 2025-04-04 RX ORDER — SODIUM CHLORIDE 9 MG/ML
INJECTION, SOLUTION INTRAVENOUS CONTINUOUS
Status: DISCONTINUED | OUTPATIENT
Start: 2025-04-04 | End: 2025-04-04 | Stop reason: HOSPADM

## 2025-04-04 RX ORDER — TROPICAMIDE 10 MG/ML
1 SOLUTION/ DROPS OPHTHALMIC SEE ADMIN INSTRUCTIONS
Status: DISCONTINUED | OUTPATIENT
Start: 2025-04-04 | End: 2025-04-04 | Stop reason: HOSPADM

## 2025-04-04 RX ORDER — SODIUM CHLORIDE 0.9 % (FLUSH) 0.9 %
5-40 SYRINGE (ML) INJECTION EVERY 12 HOURS SCHEDULED
Status: DISCONTINUED | OUTPATIENT
Start: 2025-04-04 | End: 2025-04-04 | Stop reason: HOSPADM

## 2025-04-04 RX ORDER — PROPARACAINE HYDROCHLORIDE 5 MG/ML
1 SOLUTION/ DROPS OPHTHALMIC ONCE
Status: COMPLETED | OUTPATIENT
Start: 2025-04-04 | End: 2025-04-04

## 2025-04-04 RX ORDER — SODIUM CHLORIDE, SODIUM LACTATE, POTASSIUM CHLORIDE, CALCIUM CHLORIDE 600; 310; 30; 20 MG/100ML; MG/100ML; MG/100ML; MG/100ML
INJECTION, SOLUTION INTRAVENOUS CONTINUOUS
Status: DISCONTINUED | OUTPATIENT
Start: 2025-04-04 | End: 2025-04-04 | Stop reason: HOSPADM

## 2025-04-04 RX ORDER — POVIDONE-IODINE 5 %
SOLUTION, NON-ORAL OPHTHALMIC (EYE) PRN
Status: DISCONTINUED | OUTPATIENT
Start: 2025-04-04 | End: 2025-04-04 | Stop reason: ALTCHOICE

## 2025-04-04 RX ORDER — SODIUM CHLORIDE 0.9 % (FLUSH) 0.9 %
5-40 SYRINGE (ML) INJECTION PRN
Status: DISCONTINUED | OUTPATIENT
Start: 2025-04-04 | End: 2025-04-04 | Stop reason: HOSPADM

## 2025-04-04 RX ORDER — OFLOXACIN 3 MG/ML
1 SOLUTION/ DROPS OPHTHALMIC ONCE
Status: COMPLETED | OUTPATIENT
Start: 2025-04-04 | End: 2025-04-04

## 2025-04-04 RX ORDER — FENTANYL CITRATE 50 UG/ML
INJECTION, SOLUTION INTRAMUSCULAR; INTRAVENOUS
Status: DISCONTINUED | OUTPATIENT
Start: 2025-04-04 | End: 2025-04-04 | Stop reason: SDUPTHER

## 2025-04-04 RX ORDER — TETRACAINE HYDROCHLORIDE 5 MG/ML
SOLUTION OPHTHALMIC PRN
Status: DISCONTINUED | OUTPATIENT
Start: 2025-04-04 | End: 2025-04-04 | Stop reason: ALTCHOICE

## 2025-04-04 RX ORDER — SODIUM CHLORIDE 9 MG/ML
INJECTION, SOLUTION INTRAVENOUS PRN
Status: DISCONTINUED | OUTPATIENT
Start: 2025-04-04 | End: 2025-04-04 | Stop reason: HOSPADM

## 2025-04-04 RX ORDER — BALANCED SALT SOLUTION 6.4; .75; .48; .3; 3.9; 1.7 MG/ML; MG/ML; MG/ML; MG/ML; MG/ML; MG/ML
SOLUTION OPHTHALMIC PRN
Status: DISCONTINUED | OUTPATIENT
Start: 2025-04-04 | End: 2025-04-04 | Stop reason: ALTCHOICE

## 2025-04-04 RX ADMIN — OFLOXACIN 1 DROP: 3 SOLUTION OPHTHALMIC at 07:45

## 2025-04-04 RX ADMIN — TROPICAMIDE 1 DROP: 10 SOLUTION/ DROPS OPHTHALMIC at 07:59

## 2025-04-04 RX ADMIN — MIDAZOLAM 1 MG: 1 INJECTION INTRAMUSCULAR; INTRAVENOUS at 09:08

## 2025-04-04 RX ADMIN — PHENYLEPHRINE HYDROCHLORIDE 1 DROP: 25 SOLUTION/ DROPS OPHTHALMIC at 07:50

## 2025-04-04 RX ADMIN — FENTANYL CITRATE 25 MCG: 50 INJECTION, SOLUTION INTRAMUSCULAR; INTRAVENOUS at 09:11

## 2025-04-04 RX ADMIN — SODIUM CHLORIDE, POTASSIUM CHLORIDE, SODIUM LACTATE AND CALCIUM CHLORIDE: 600; 310; 30; 20 INJECTION, SOLUTION INTRAVENOUS at 07:53

## 2025-04-04 RX ADMIN — MIDAZOLAM 1 MG: 1 INJECTION INTRAMUSCULAR; INTRAVENOUS at 09:14

## 2025-04-04 RX ADMIN — TROPICAMIDE 1 DROP: 10 SOLUTION/ DROPS OPHTHALMIC at 07:45

## 2025-04-04 RX ADMIN — PHENYLEPHRINE HYDROCHLORIDE 1 DROP: 25 SOLUTION/ DROPS OPHTHALMIC at 07:59

## 2025-04-04 RX ADMIN — ONDANSETRON 4 MG: 2 INJECTION INTRAMUSCULAR; INTRAVENOUS at 09:08

## 2025-04-04 RX ADMIN — PHENYLEPHRINE HYDROCHLORIDE 1 DROP: 25 SOLUTION/ DROPS OPHTHALMIC at 07:45

## 2025-04-04 RX ADMIN — PROPARACAINE HYDROCHLORIDE 1 DROP: 5 SOLUTION/ DROPS OPHTHALMIC at 07:45

## 2025-04-04 RX ADMIN — TROPICAMIDE 1 DROP: 10 SOLUTION/ DROPS OPHTHALMIC at 07:50

## 2025-04-04 ASSESSMENT — PAIN - FUNCTIONAL ASSESSMENT
PAIN_FUNCTIONAL_ASSESSMENT: 0-10

## 2025-04-04 NOTE — H&P
I have examined the patient and reviewed the history and physical from 3/28/25  and find no relevant changes.  Blood pressure 117/73, pulse 62, temperature 97.3 °F (36.3 °C), temperature source Temporal, resp. rate 16, height 1.778 m (5' 10\"), weight 104.3 kg (230 lb), SpO2 98%.      I have reviewed with the patient and/or family the risks, benefits, and alternatives to the procedure and they have agreed to proceed.    Angel De Leon MD

## 2025-04-04 NOTE — DISCHARGE INSTRUCTIONS
[facility]  Instructions after Vitreoretinal Surgery  [phone]  Your post op appointment is 4/5/25 with Nicole HOSKINS at 8:00 in the [place] office,  [address]    It is very important to follow these instructions after your eye surgery to ensure its success.  Please bring this sheet with you to your first follow up appointment.    1.   A responsible adult must drive you home after surgery. You might feel well, but the medications given during the surgery might affect you for several hours afterward. Do not drive, operate machinery or participate in any activity that requires coordination or judgment for at least 24 hours after your surgery.     2. DO NOT take the patch and shield off, unless otherwise instructed by your physician. Your doctor will remove it at your follow-up appointment.  The metal/plastic shield will be worn at bedtime for 1 week following your surgery.     3. Your head positioning is chin to chest, 45 minutes of every hour 15 minutes head up, sleep on either side at night. NO LYING ON YOUR BACK      4. Activity should be limited to: NOTHING MORE STRENUOUS THAN WALKING FOR 3 WEEKS. Avoid any straining, lifting or bending for the first week.    5. An ice pack may be used if the eye is uncomfortable. Remove only the metal/plastic shield, leaving the patch in place. (The patch may be discolored, bloody or wet THIS IS NORMAL).  Ice may be used for 20 minutes at a time, every 2 to 4 hours for the first 24-36 hours after surgery.    6. If you have a GAS BUBBLE in your eye: DO NOT REMOVE THE ALERT BRACELET until you are instructed to do so by your physician. In most cases this will remain on for 6-8 weeks. Air travel or traveling in high altitudes is prohibited until approved by your physician.     7. After your surgery you can resume all of your regular medications. This includes Blood thinners such as Aspirin,  Plavix, Coumadin, or Warfarin.     8. Pain-Relieving

## 2025-05-21 NOTE — TELEPHONE ENCOUNTER
Status post op DENIZ pending dental appointment.    .Last appointment 2/14/2023  Next appointment No future appointments.   Last refill   DOS:       Patient called in requesting refill of :    cephALEXin (KEFLEX) 500 MG capsule     [facility] #[id number] - [place], [place] - [address] - P [phone] - F [phone]

## 2025-05-22 RX ORDER — CEPHALEXIN 500 MG/1
2000 CAPSULE ORAL DAILY PRN
Qty: 4 CAPSULE | Refills: 4 | Status: SHIPPED | OUTPATIENT
Start: 2025-05-22

## (undated) DEVICE — NDL CNTR 40CT FM MAG: Brand: MEDLINE INDUSTRIES, INC.

## (undated) DEVICE — TOWEL,OR,DSP,ST,BLUE,STD,6/PK,12PK/CS: Brand: MEDLINE

## (undated) DEVICE — SPONGE LAP W18XL18IN WHT COT 4 PLY FLD STRUNG RADPQ DISP ST

## (undated) DEVICE — COVER,LIGHT HANDLE,FLX,1/PK: Brand: MEDLINE INDUSTRIES, INC.

## (undated) DEVICE — TUBING, SUCTION, 1/4" X 10', STRAIGHT: Brand: MEDLINE

## (undated) DEVICE — PITCHER PT 1200ML W HNDL CSR WRP

## (undated) DEVICE — NEEDLE SYRINGE 18GA L1.5IN RED PLAS HUB S STL BLNT FILL W/O

## (undated) DEVICE — GOWN,SIRUS,FABRNF,XL,20/CS: Brand: MEDLINE

## (undated) DEVICE — Z DISCONTINUED USE 2516375 APPLICATOR MEDICATED 3 CC CLR STRL CHLORAPREP

## (undated) DEVICE — STANDARD HYPODERMIC NEEDLE,POLYPROPYLENE HUB: Brand: MONOJECT

## (undated) DEVICE — 3M™ TRANSPORE™ WHITE SURGICAL TAPE 1534-1, 1 INCH X 10 YARD (2,5CM X 9,1M), 12 ROLLS/CARTON 10 CARTONS/CASE: Brand: 3M™ TRANSPORE™

## (undated) DEVICE — DOUBLE BASIN SET: Brand: MEDLINE INDUSTRIES, INC.

## (undated) DEVICE — SUTURE SUTTAPE L40IN DIA1.3MM NONABSORBABLE WHT BLU L26.5MM AR7500

## (undated) DEVICE — PAD,ABDOMINAL,8"X10",ST,LF: Brand: MEDLINE

## (undated) DEVICE — INTENDED FOR TISSUE SEPARATION, AND OTHER PROCEDURES THAT REQUIRE A SHARP SURGICAL BLADE TO PUNCTURE OR CUT.: Brand: BARD-PARKER ® STAINLESS STEEL BLADES

## (undated) DEVICE — 2108 SERIES SAGITTAL BLADE (24.8 X 0.88 X 90.5MM)

## (undated) DEVICE — T4 HOOD

## (undated) DEVICE — SYRINGE MED 10ML LUERLOCK TIP W/O SFTY DISP

## (undated) DEVICE — MEDI-VAC YANKAUER SUCTION HANDLE: Brand: CARDINAL HEALTH

## (undated) DEVICE — ELECTRODE PT RET AD L9FT HI MOIST COND ADH HYDRGEL CORDED

## (undated) DEVICE — NEEDLE FLTR 18GA L1.5IN MEM THK5UM BLNT DISP

## (undated) DEVICE — SURGICAL PROCEDURE PACK ORTH IV ECLIPSE STRL

## (undated) DEVICE — MARKER,SKIN,WI/RULER AND LABELS: Brand: MEDLINE

## (undated) DEVICE — SYRINGE MED 50ML LUERLOCK TIP

## (undated) DEVICE — SYRINGE IRRIG 60ML SFT PLIABLE BLB EZ TO GRP 1 HND USE W/

## (undated) DEVICE — 3M™ IOBAN™ 2 ANTIMICROBIAL INCISE DRAPE 6651EZ: Brand: IOBAN™ 2

## (undated) DEVICE — BANDAGE COMPR L W4INXL11YD 100% COT WVN E DBL LEN CLP CLSR

## (undated) DEVICE — 1060 S-DRAPE SPCL INCISE 10/BX 4BX/CS: Brand: STERI-DRAPE™

## (undated) DEVICE — SUTURE STRATAFIX SYMMETRIC SZ 1 L18IN ABSRB VLT CT1 L36CM SXPP1A404

## (undated) DEVICE — BLADE ES L6IN ELASTOMERIC COAT EXT DURABLE BEND UPTO 90DEG

## (undated) DEVICE — 3M™ STERI-DRAPE™ U-DRAPE 1015: Brand: STERI-DRAPE™

## (undated) DEVICE — APPLICATOR SURG XL L38CM FOR ARISTA ABSRB HEMSTAT FLEXITIP

## (undated) DEVICE — SOLUTION IV IRRIG WATER 1000ML POUR BRL 2F7114

## (undated) DEVICE — SET EXTN L14IN 1ML IV L BOR NO FLTR NO STPCOCK

## (undated) DEVICE — Device

## (undated) DEVICE — Z DISCONTINUED NO SUB IDED GLOVE SURG BEAD CUF 8 STD PF WHT STRL TRIUMPH LT LTX

## (undated) DEVICE — STOPCOCK IV PRIMING 0.26ML 3 W W/ TWO FEM LUERLOK PRT 1

## (undated) DEVICE — COVER,TABLE,60X90,STERILE: Brand: MEDLINE

## (undated) DEVICE — PENCIL ES L3M BTTN SWCH HOLSTER W/ BLDE ELECTRD EDGE

## (undated) DEVICE — PICO SINGLE USE NEGATIVE PRESSURE WOUND THERAPY SYSTEM 10CM X 30CM 4IN. X 12IN.: Brand: PICO

## (undated) DEVICE — SET MAJOR INSTR ORTHO

## (undated) DEVICE — Z DISCONTINUED USE 2275686 GLOVE SURG SZ 8 L12IN FNGR THK13MIL WHT ISOLEX POLYISOPRENE

## (undated) DEVICE — APPLICATOR MEDICATED 26 CC SOLUTION HI LT ORNG CHLORAPREP

## (undated) DEVICE — SOLUTION IV IRRIG POUR BRL 0.9% SODIUM CHL 2F7124

## (undated) DEVICE — BANDAGE COMPR W6INXL5YD SELF ADH COHESIVE CO FLX

## (undated) DEVICE — PAD,ABDOMINAL,5"X9",ST,LF,25/BX: Brand: MEDLINE INDUSTRIES, INC.

## (undated) DEVICE — GARMENT,MEDLINE,DVT,INT,CALF,MED, GEN2: Brand: MEDLINE

## (undated) DEVICE — VITRECTOMY PACK 25 GA INPUT

## (undated) DEVICE — SOLUTION IV IRRIG 500ML 0.9% SODIUM CHL 2F7123

## (undated) DEVICE — GOWN,SIRUS,POLYRNF,BRTHSLV,XLN/XL,20/CS: Brand: MEDLINE

## (undated) DEVICE — GAUZE,SPONGE,4"X4",16PLY,STRL,LF,10/TRAY: Brand: MEDLINE

## (undated) DEVICE — GLOVE SURG SZ 8 L12IN FNGR THK94MIL STD WHT LTX FREE

## (undated) DEVICE — Z DISCONTINUED USE 2272124 DRAPE SURG XL N INVASIVE 2 LAYR DISP